# Patient Record
Sex: FEMALE | Race: WHITE | NOT HISPANIC OR LATINO | Employment: FULL TIME | URBAN - METROPOLITAN AREA
[De-identification: names, ages, dates, MRNs, and addresses within clinical notes are randomized per-mention and may not be internally consistent; named-entity substitution may affect disease eponyms.]

---

## 2017-09-14 ENCOUNTER — TRANSCRIBE ORDERS (OUTPATIENT)
Dept: ADMINISTRATIVE | Facility: HOSPITAL | Age: 52
End: 2017-09-14

## 2017-09-14 DIAGNOSIS — Z12.31 ENCOUNTER FOR SCREENING MAMMOGRAM FOR MALIGNANT NEOPLASM OF BREAST: Primary | ICD-10-CM

## 2017-10-16 ENCOUNTER — ALLSCRIPTS OFFICE VISIT (OUTPATIENT)
Dept: OTHER | Facility: OTHER | Age: 52
End: 2017-10-16

## 2017-10-16 DIAGNOSIS — Z12.31 ENCOUNTER FOR SCREENING MAMMOGRAM FOR MALIGNANT NEOPLASM OF BREAST: ICD-10-CM

## 2017-10-17 NOTE — PROGRESS NOTES
Assessment  1  Encounter for routine gynecological examination () (Z01 419)    Plan  Encounter for screening mammogram for malignant neoplasm of breast    · * MAMMO SCREENING BILATERAL W CAD; Status:Hold For - Scheduling; Requested  for:2017;    Perform:[facility] Radiology; Due:2018; Ordered;For:Encounter for screening mammogram for malignant neoplasm of breast; Ordered Tellnevaeh Moore;  Health Maintenance    · 1 - Robert MCKEON, Avery Deshpande (Gastroenterology) Co-Management  *colonoscopy  Status:  Active  Requested for: 61KHN8887   Ordered; For: Health Maintenance; Ordered By: Daniel Dsouza Performed:  Due: 64MGX1597  Care Summary provided  : Yes  Stress Incontinence    · 2 - Massimo OLIVEIRA, Anna Chen  (Obstetrics/Gynecology) Co-Management  *  Status: Hold For -  Scheduling  Requested for: 96XNK9518   Ordered; For: Stress Incontinence; Ordered By: Daniel Dsouza Performed:  Due: 54ADV1362  Care Summary provided  : Yes    Discussion/Summary  health maintenance visit Currently, she eats a healthy diet  the risks and benefits of cervical cancer screening were discussed cervical cancer screening is current Breast cancer screening: monthly self breast exam was advised, mammogram has been ordered and mammogram is needed every year  Colorectal cancer screening: colonoscopy has been ordered  Patient discussion: discussed with the patient  The patient has the current Goals: Well-woman exam  The patent has the current Barriers: None  Patient is able to Self-Care  Chief Complaint  pt presents for a yearly exam      History of Present Illness  HPI: 47 yo  for annual exam, still having problems with LAURO, but improving with weight loss - started the Whole 30making a visit with Colville Crew but deductible was very high;     GYN HM, Adult Female [facility]: The patient is being seen for a gynecology evaluation  The last health maintenance visit was 1 5 year(s) ago  General Health:  The patient's health since the last visit is described as good  Lifestyle:  She does not use tobacco -- She denies alcohol use  -- She denies drug use  Reproductive health:  she reports no menstrual problems  -- she is sexually active  -- pregnancy history: G 5P 4  Screening: Cervical cancer screening includes a pap smear performed -- and-- human papilloma virus screening performed   Breast cancer screening includes a mammogram performed 2016  She hasn't been previously screened for colorectal cancer  Review of Systems  urinary loss of control, but-- no pelvic pain,-- no pelvic pressure,-- no vaginal pain,-- no vaginal discharge,-- no vaginal itching,-- no vaginal lump or mass,-- no vaginal odor,-- no nonmenstrual bleeding,-- no vulvar pain,-- no vulvar itching,-- no vulvar lump or mass-- and-- no dysuria  Constitutional: No fever, no chills, feels well, no tiredness, no recent weight gain or loss  ENT: no ear ache, no loss of hearing, no nosebleeds or nasal discharge, no sore throat or hoarseness  Cardiovascular: no complaints of slow or fast heart rate, no chest pain, no palpitations, no leg claudication or lower extremity edema  Respiratory: no complaints of shortness of breath, no wheezing, no dyspnea on exertion, no orthopnea or PND  Breasts: no complaints of breast pain, breast lump or nipple discharge  Gastrointestinal: no complaints of abdominal pain, no constipation, no nausea or diarrhea, no vomiting, no bloody stools  Genitourinary: as noted in HPI  Musculoskeletal: no complaints of arthralgia, no myalgia, no joint swelling or stiffness, no limb pain or swelling  Integumentary: no complaints of skin rash or lesion, no itching or dry skin, no skin wounds  Neurological: no complaints of headache, no confusion, no numbness or tingling, no dizziness or fainting  ROS reviewed  OB History  Pregnancy History (Brief):   Prior pregnancies: : 4  Para: 4 (full-term)       Active Problems  1   BMI 27 0-27 9,adult (V85 23) (Z68 27)   2  Encounter for routine gynecological examination (V72 31) (Z01 419)   3  Encounter for routine gynecological examination (V72 31) (Z01 419)   4  Encounter for screening mammogram for malignant neoplasm of breast (V76 12)   (Z12 31)   5  Screening for HPV (human papillomavirus) (V73 81) (Z11 51)   6  Stress Incontinence (788 39)    Past Medical History    The active problems and past medical history were reviewed and updated today  Surgical History   · History of Supracervical Hysterectomy    The surgical history was reviewed and updated today  Family History    The family history was reviewed and updated today  Social History   · Never A Smoker  The social history was reviewed and updated today  The social history was reviewed and is unchanged  Allergies  1  No Known Drug Allergies    Vitals   Recorded: 18QGW7528 30:43AD   Systolic 741   Diastolic 58   Height 5 ft 7 in   Weight 167 lb    BMI Calculated 26 16   BSA Calculated 1 87     Physical Exam    Constitutional   General appearance: No acute distress, well appearing and well nourished  Neck   Neck: Normal, supple, trachea midline, no masses  Thyroid: Normal, no thyromegaly  Pulmonary   Respiratory effort: No increased work of breathing or signs of respiratory distress  Auscultation of lungs: Clear to auscultation  Cardiovascular   Auscultation of heart: Normal rate and rhythm, normal S1 and S2, no murmurs  Peripheral vascular exam: Normal pulses Throughout  Genitourinary   External genitalia: Normal and no lesions appreciated  Vagina: Normal, no lesions or dryness appreciated  Urethra: Normal     Urethral meatus: Normal     Bladder: Normal, soft, non-tender and no prolapse or masses appreciated  Cervix: Normal, no palpable masses  Uterus: Surgically absent  Adnexa/parametria: Normal, non-tender and no fullness or masses appreciated      Chest   Breasts: Normal and no dimpling or skin changes noted  Abdomen   Abdomen: Normal, non-tender, and no organomegaly noted  Lymphatic   Palpation of lymph nodes in neck, axillae, groin and/or other locations: No lymphadenopathy or masses noted  Skin   Skin and subcutaneous tissue: Normal skin turgor and no rashes  Palpation of skin and subcutaneous tissue: Normal     Psychiatric   Orientation to person, place, and time: Normal     Mood and affect: Normal        Future Appointments    Date/Time Provider Specialty Site   10/18/2018 06:30 PM DIMITRIS Yan   [facility] [place]     Signatures   Electronically signed by : DIMITRIS Savage ; Oct 16 2017 10:27AM EST                       (Author)

## 2017-11-29 ENCOUNTER — ALLSCRIPTS OFFICE VISIT (OUTPATIENT)
Dept: OTHER | Facility: OTHER | Age: 52
End: 2017-11-29

## 2017-11-29 DIAGNOSIS — N39.498 OTHER SPECIFIED URINARY INCONTINENCE (CODE): ICD-10-CM

## 2017-11-30 NOTE — CONSULTS
Assessment    1  Urinary incontinence NEC (788 39) (N39 [id number])    Plan  Urinary incontinence NEC    · 2 - Massimo OLIVEIRA, Celestina Hough  (Obstetrics/Gynecology) Co-Management  *  Status: Hold For -Scheduling  Requested for: 18PSR0030  Ordered; For: Urinary incontinence NEC;  Ordered By: Kelly Yoder  Performed:   Due: 23VHK8377  () Care Summary provided  : Yes   · SL Pelvic Floor Physical Therapy Referral Co-Management  *  Status: Active  Requestedfor: 58LVP1136  Ordered; For: Urinary incontinence NEC;  Ordered By: Kelly Yoder  Performed:   Due: 36EZK2828  () Care Summary provided  : Yes    Discussion/Summary  Discussion Summary:   I have offered to set the patient up for pelvic floor exam and cystoscopy to evaluate for coexisting prolapse and did demonstrate her stress urinary incontinence  This would be a pre records that prior to simple mid urethral sling surgery  patient is extremely motivated that if she were to need surgery she would want to proceed before the end of 2017  Because of scheduling constraints, I have also offered to refer the patient to an additional pelvic floor surgeon who may have more availability in this regard  If the patient does not have success with scheduling, I would be happy to move forward with her testing here in the office  the meantime, the patient has requested referral to pelvic floor physical therapy  This may be beneficial in helping the patient with non operative management  She will call me after she fingers out the scheduling constraints and alert me if she is interested in moving forward  Patient's Capacity to Self-Care: Patient is able to Self-Care  Goals and Barriers: The patient has the current Goals: Improvement of continence  The patent has the current Barriers: Insurance and timing  Self Referrals:   Self Referrals: Yes1        1 Amended By: Dedra Desir;  Nov 30 2017 9:08 AM EST    Chief Complaint  Chief Complaint Free Text Note Form: Pt presents to office for consult for stress incontinence  History of Present Illness  HPI: 57-year-old female with a history of a lifetime of stress urinary incontinence even in her teenage years  Patient has had a hysterectomy and has significant stress urinary incontinence  She is physically very active and notes incontinence when she exercises  She has not previously been seen by urologist  Her GYN doctor recommended referral for sling placement  patient is very interested in expedited surgery if that is a potential option for her before the end of 2017  Review of Systems  Complete-Female Urology:  Genitourinary: Empty sensation,-- incontinence-- and-- stream quality good, but-- no dysuria,-- no urinary hesitancy,-- no feelings of urinary urgency,-- no hematuria-- and-- no nocturia  ROS Reviewed:   ROS reviewed  Active Problems    1  BMI 27 0-27 9,adult (V85 23) (Z68 27)  2  Encounter for routine gynecological examination (V72 31) (Z01 419)  3  Encounter for routine gynecological examination (V72 31) (Z01 419)  4  Encounter for screening mammogram for malignant neoplasm of breast (V76 12) (Z12 31)  5  Screening for HPV (human papillomavirus) (V73 81) (Z11 51)  6  Urinary incontinence NEC (788 39) (Q20 373)    Past Medical History  Active Problems And Past Medical History Reviewed: The active problems and past medical history were reviewed and updated today  Surgical History  1  History of Supracervical Hysterectomy  Surgical History Reviewed: The surgical history was reviewed and updated today  Family History  Family History Reviewed: The family history was reviewed and updated today  Social History   ·    · Never A Smoker   · No alcohol use  Social History Reviewed: The social history was reviewed and updated today  Current Meds  1  No Reported Medications Recorded  Medication List Reviewed: The medication list was reviewed and updated today  Allergies  1   No Known Drug Allergies    Vitals  Vital Signs    Recorded: 98SQU2816 01:46PM   Heart Rate 72   Respiration 16   Systolic 201   Diastolic 80   Height 5 ft 7 in   Weight 171 lb    BMI Calculated 26 78   BSA Calculated 1 89       Physical Exam   Constitutional  General appearance: No acute distress, well appearing and well nourished  Pulmonary  Respiratory effort: No increased work of breathing or signs of respiratory distress  Cardiovascular  Examination of extremities for edema and/or varicosities: Normal    Abdomen  Abdomen: Non-tender, no masses  Musculoskeletal  Gait and station: Normal    Skin  Skin and subcutaneous tissue: Normal without rashes or lesions  Results/Data  AUA Symptom Score [id number] 01:57PM User, [employer]     Test Name Result Flag Reference   AUA Symptom Score (for prostate disease) 3       Incomplete emptying: Not at all (0) Frequency: About half the time (3) Intermittency: Not at all (0) Urgency: Not at all (0) Weak-stream: Not at all (0) Straining: Not at all (0) Nocturia: Not at all (0)   AUA Symptom Score (for prostate disease) - Quality of Life Due to Urinary Symptoms Unhappy     AUA Symptom Score (for prostate disease) - Score Category Mild         Future Appointments    Date/Time Provider Specialty Site   10/18/2018 06:30 PM DIMITRIS Erazo   Obstetrics/Gynecology OB [employer] [facility]       Signatures   Electronically signed by : DIMITRIS Peña ; Nov 29 2017  2:18PM EST                       (Author)    Electronically signed by : DIMITRIS Peña ; Nov 30 2017 12:11PM EST

## 2018-01-14 VITALS
DIASTOLIC BLOOD PRESSURE: 80 MMHG | WEIGHT: 171 LBS | HEIGHT: 67 IN | RESPIRATION RATE: 16 BRPM | SYSTOLIC BLOOD PRESSURE: 130 MMHG | HEART RATE: 72 BPM | BODY MASS INDEX: 26.84 KG/M2

## 2018-01-15 VITALS
WEIGHT: 167 LBS | BODY MASS INDEX: 26.21 KG/M2 | SYSTOLIC BLOOD PRESSURE: 100 MMHG | HEIGHT: 67 IN | DIASTOLIC BLOOD PRESSURE: 58 MMHG

## 2018-01-18 NOTE — RESULT NOTES
Verified Results  (1) THIN PREP PAP WITH IMAGING [id number] 11:01AM Katerine John     Test Name Result Flag Reference   LAB AP CASE REPORT (Report)     Gynecologic Cytology Report            Case: [id number]                  Authorizing Provider: Debbie Tyson MD     Collected:      05/04/2016 1101        First Screen:     IVON Vega Received:      05/05/2016 1101        Specimen:  LIQUID-BASED PAP, SCREENING, Endocervical   HPV HIGH RISK RESULT (Report)     HPV, High Risk: HPV NEG, HPV16 NEG, HPV18 NEG      Other High Risk HPV Negative, HPV 16 Negative, HPV 18 Negative  HPV types: 16,18,31,33,35,39,45,51,52,56,58,59,66 and 68 DNA are undetectable or below the pre-set threshold  Roche?s FDA approved Cecile 4800 is utilized with strict adherence to the ?s instruction  manual to test for the presence of High-Risk HPV DNA, as well as HPV 16 and HPV 18  This instrument  has been validated by our laboratory and/or by the   A negative result does not preclude the presence of HPV infection because results depend on adequate  specimen collection, absence of inhibitors and sufficient DNA to be detected  Additionally, HPV negative  results are not intended to prevent women from proceeding to colposcopy if clinically warranted  Positive HPV test results indicate the presence of any one or more of the high risk types, but since patients  are often co-infected with low-risk types it does not rule out the presence of low-risk types in patients  with mixed infections  LAB AP GYN PRIMARY INTERPRETATION      Negative for intraepithelial lesion or malignancy   LAB AP GYN SPECIMEN ADEQUACY      Satisfactory for evaluation  Endocervical/transformation zone component present     LAB AP GYN ADDITIONAL INFORMATION (Report)     [employer]'s FDA approved ,  and ThinPrep Imaging System are   utilized with strict adherence to the 's instruction manual to   prepare gynecologic and non-gynecologic cytology specimens for the   production of ThinPrep slides as well as for gynecologic ThinPrep imaging  These processes have been validated by our laboratory and/or by the     The Pap test is not a diagnostic procedure and should not be used as the   sole means to detect cervical cancer  It is only a screening procedure to   aid in the detection of cervical cancer and its precursors  Both   false-negative and false-positive results have been experienced  Your   patient's test result should be interpreted in this context together with   the history and clinical findings     LAB AP LMP

## 2018-10-18 ENCOUNTER — ANNUAL EXAM (OUTPATIENT)
Dept: OBGYN CLINIC | Facility: MEDICAL CENTER | Age: 53
End: 2018-10-18
Payer: COMMERCIAL

## 2018-10-18 VITALS — WEIGHT: 178 LBS | SYSTOLIC BLOOD PRESSURE: 128 MMHG | DIASTOLIC BLOOD PRESSURE: 80 MMHG | BODY MASS INDEX: 27.88 KG/M2

## 2018-10-18 DIAGNOSIS — N39.3 SUI (STRESS URINARY INCONTINENCE, FEMALE): ICD-10-CM

## 2018-10-18 DIAGNOSIS — Z12.39 SCREENING FOR MALIGNANT NEOPLASM OF BREAST: ICD-10-CM

## 2018-10-18 DIAGNOSIS — N39.3 STRESS INCONTINENCE OF URINE: ICD-10-CM

## 2018-10-18 DIAGNOSIS — Z01.419 ENCOUNTER FOR WELL WOMAN EXAM WITH ROUTINE GYNECOLOGICAL EXAM: Primary | ICD-10-CM

## 2018-10-18 PROCEDURE — 99396 PREV VISIT EST AGE 40-64: CPT | Performed by: OBSTETRICS & GYNECOLOGY

## 2018-10-18 NOTE — PROGRESS NOTES
ASSESSMENT & PLAN: Bhavya Dunaway is a 45 y o  No obstetric history on file  with normal gynecologic exam     1   Routine well woman exam done today  2  Pap and HPV:  The patient's last pap and hpv was 2016  It was normal     Pap and cotesting was not done today  Current ASCCP Guidelines reviewed  3   Mammogram ordered  4  Colonoscopy - will refer to someone in [place]  5  The following were reviewed in today's visit: breast self exam and mammography screening ordered  6  GSUI - refer to Massimo    CC:  Annual Gynecologic Examination    HPI: Bhavya Dunaway is a 45 y o  No obstetric history on file  who presents for annual gynecologic examination  She has the following concerns:  Still complains of GSUI -   Interested in surgical management with pelvic floor therapy  Health Maintenance:    She wears her seatbelt routinely  She does perform regular monthly self breast exams  She feels safe at home  Pap: 2016  Last mammogram: 2016  Last colonoscopy: never    History reviewed  No pertinent past medical history  History reviewed  No pertinent surgical history  Past OB/Gyn History:  OB History     No data available        Pt does not have menstrual issues  History of sexually transmitted infection: No   History of abnormal pap smears: No      Patient is currently sexually active  heterosexual   The current method of family planning is none  History reviewed  No pertinent family history  Social History:  Social History     Social History    Marital status: /Civil Union     Spouse name: N/A    Number of children: N/A    Years of education: N/A     Occupational History    Not on file       Social History Main Topics    Smoking status: Never Smoker    Smokeless tobacco: Never Used    Alcohol use No    Drug use: No    Sexual activity: Yes     Partners: Male     Birth control/ protection: None     Other Topics Concern    Not on file     Social History Narrative    No narrative on file     No Known Allergies  No current outpatient prescriptions on file  Review of Systems  Constitutional :no fever, feels well, no tiredness, no recent weight gain or loss  ENT: no ear ache, no loss of hearing, no nosebleeds or nasal discharge, no sore throat or hoarseness  Cardiovascular: no complaints of slow or fast heart beat, no chest pain, no palpitations, no leg claudication or lower extremity edema  Respiratory: no complaints of shortness of shortness of breath, no FINE  Breasts:no complaints of breast pain, breast lump, or nipple discharge  Gastrointestinal: no complaints of abdominal pain, constipation, nausea, vomiting, or diarrhea or bloody stools  Genitourinary : no complaints of dysuria, incontinence, pelvic pain, no dysmenorrhea, vaginal discharge or abnormal vaginal bleeding and as noted in HPI  Musculoskeletal: no complaints of arthralgia, no myalgia, no joint swelling or stiffness, no limb pain or swelling    Integumentary: no complaints of skin rash or lesion, itching or dry skin  Neurological: no complaints of headache, no confusion, no numbness or tingling, no dizziness or fainting    Objective      /80   Wt 80 7 kg (178 lb)   BMI 27 88 kg/m²     General:   appears stated age, cooperative, alert normal mood and affect   Neck: normal, supple,trachea midline, no masses   Heart: regular rate and rhythm, S1, S2 normal, no murmur, click, rub or gallop   Lungs: clear to auscultation bilaterally   Breasts: normal appearance, no masses or tenderness, Inspection negative, No nipple retraction or dimpling, No nipple discharge or bleeding, No axillary or supraclavicular adenopathy, Normal to palpation without dominant masses   Abdomen: soft, non-tender, without masses or organomegaly   Vulva: normal female genitalia, Bartholin's, Urethra, Angostura normal, no lesions, normal female hair distribution   Vagina: normal vagina, no discharge, exudate, lesion, or erythema   Urethra: normal   Cervix: Normal, no discharge  Uterus: normal size, contour, position, consistency, mobility, non-tender   Adnexa: normal adnexa   Lymphatic palpation of lymph nodes in neck, axilla, groin and/or other locations: no lymphadenopathy or masses noted   Skin normal skin turgor and no rashes     Psychiatric orientation to person, place, and time: normal  mood and affect: normal

## 2018-11-05 ENCOUNTER — EVALUATION (OUTPATIENT)
Dept: PHYSICAL THERAPY | Facility: CLINIC | Age: 53
End: 2018-11-05
Payer: COMMERCIAL

## 2018-11-05 DIAGNOSIS — N39.3 SUI (STRESS URINARY INCONTINENCE, FEMALE): Primary | ICD-10-CM

## 2018-11-05 PROCEDURE — 97530 THERAPEUTIC ACTIVITIES: CPT | Performed by: PHYSICAL THERAPIST

## 2018-11-05 PROCEDURE — 97163 PT EVAL HIGH COMPLEX 45 MIN: CPT | Performed by: PHYSICAL THERAPIST

## 2018-11-05 NOTE — PROGRESS NOTES
PT Evaluation     Today's date: 2018  Patient name: Jesús Her  : 1965  MRN: [id number]  Referring provider: Mellissa Wellington MD  Dx:   Encounter Diagnosis     ICD-10-CM    1  LAURO (stress urinary incontinence, female) N39 3        Assessment    Assessment details: Shan Becerril is a 44 yo female who presents with chronic history of stress urinary incontinence, especially with exercise, sneezing, laughing and coughing  Subsequently she also reports a chronic history of constipation  Her fluid intake is good and bladder irritant intake is low  She demonstrates a quad dominant strength pattern as evidenced by decreased strength in bilateral gluteals and hamstrings  She demonstrates fair pelvic floor "lift" during pelvic floor muscle evaluation with good perineal mobility however exhibits poor PFM sphincter closure strength when performing a PFM contraction  She does not exhibit signs of pelvic organ prolapse and does not present with symptoms of pelvic floor muscle hypertonicity or pain  It is likely that chronic constipation as well as impaired PFM strength and poor breathe control are impacting her ability to engage in exercise without urinary leakage  She would benefit from a course of physical therapy to address safe strengthening activities (ie: has been doing sit ups consistently), running form and breathe control during exercise, pelvic floor muscle strength and LE flexibility/PFM flexibility  Thank you for referring Shan Becerril to PT  Goals    * Normalize sEMG findings to indicate strength average > 12uV and resting average < 2 0uV  -When available  * Presents with improved understanding of bladder irritants in diet and makes concerted effort to reduce them by at least 50-75%  -as needed  * Patient presents with good understanding of pelvic floor protective strategies to reduce intra-abdominal pressure against pelvic organs    * Demonstrates correct isolation and relaxation of pelvic floor to palpation without overflow from global stabilizers  * Reports that she can cough/laugh/sneeze with at least  50% less bladder leakage  * Patient will use pelvic floor muscles correctly during functional ADLs such as coughing, sneezing, lifting and exercise activities to avoid excessive IAP and PFM strain  * Patient will be compliant with comprehensive home exercise program for self management of condition  Plan  Planned therapy interventions: abdominal trunk stabilization, manual therapy, motor coordination training, neuromuscular re-education, patient education, self care, strengthening, therapeutic activities, stretching, therapeutic exercise and home exercise program  Frequency: 2x week  Duration in visits: 16  Treatment plan discussed with: patient        Subjective Evaluation    History of Present Illness  Mechanism of injury: Has an appointment with Dr Toni valdez for surgical consult  Patient reports long history of stress urinary incontinence (20 years) when physically active or sick/coughing  OB history:G5,P4,A1  All deliveries were vaginal  Largest baby was 6?lbs  Hysterectomy: ~10 years ago  Due to fibroids and vaginal pain  Pain  Do you have pain with  [place]-no  Pelvic exam-no  Do you have back, leg, groin, abdominal pain  No  "I get a vaginal pain just when I work really hard, overdoing my body"    Test results: None    Bladder Symptoms  Do you lose urine when you:   Cough-Yes   Sneeze-Yes   Laugh-Yes but not always   On the way to the bathroom: Yes if I am just coming back from a run  Hear running water: Yes    Do you have leakage during sleep: No  Have burning/pain with urination: No  Difficulty starting a stream of urine: NO  Strain to empty your bladder: No  Feel unable to empty bladder fully: No  Have a falling out feeling: No  Have pain with a full bladder  Have an urgency of urination (a strong urge to urinate): No  Urinate more than 7 times/day  "Probably"  Lift/exercise/dance/jump: Yes  Other: _____      Bowel Symptoms  Do you    Strain to have a bowel movement: Always   Include fiber in your diet:Yes   Take laxatives/enema regularly: No   Have pain with bowel movement: No   Have a very strong urge to move your bowels: No   Leak/stain feces: No   Have diarrhea often: No   Leak gas by accident: Not really  How often do you move your bowels: _1__ per day  Most common stool consistency: #2 [place] Stool Chart  Fluid intake: 1/2 gallon per day most days    -Water  Will occasionally drink tea or coffee but not often  Pain  Current pain ratin  At best pain ratin  Progression: worsening    Social Support    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[employer])  Treatments  No previous or current treatments  Patient Goals  Patient goal: Decrease/eliminate urinary leakage        Objective    Bilateral hip PROM assessed and noted for decreased hip flexion, bilateral piriformis tenderness, bilateral quadriceps tightness and bilateral adductor tightness (mild)  Palpation to linea alba with active rectus contraction revealed 3 finger width diastasis at level of umbilicus  Lumbopelvic control for bridge/stability position: fair   Strength: Grossly WNL  Breathe control: Poor ability to isolate diaphragmatic breathe with notable apical breathing pattern when attempting diaphragmatic breathe when cued  Internal PFM exam performed 18:   Pelvic floor muscle assessment  Pelvic floor anatomy was explained to patient utilizing a pelvic model and examination technique was discussed, including all risks and precautions   Patient provided verbal and written consent for internal pelvic floor muscle assessment prior to examination       External observation: + Perineal descent, + non-voluntary contraction, + voluntary contraction       Reflexes: Anal wink test present     Internal pelvic floor assessment:   4/5 strength, 5 second endurance  Able to complete 5 contract/relax in 10 seconds  No tenderness to palpation to levator ani, ATLA, obturator internis or superficial PFM  No palpation of bladder or rectal prolapse noted       Education: Pelvic floor stretch, 10 second contraction/hold in hooklying and bridges with PFM contraction  Reviewed performing the "knack" prior to coughing/sneezing/laughing  Reviewed diaphragmatic breathing    Precautions: Standard    Daily Treatment Diary     Manual     Assess PFM                        Exercise Diary     Diaphragmatic breathing    PFM strengthening progression  HEP: Soften bowels (will try prunes), use stool for hip flexion during bowel movements

## 2018-11-06 ENCOUNTER — OFFICE VISIT (OUTPATIENT)
Dept: PHYSICAL THERAPY | Facility: CLINIC | Age: 53
End: 2018-11-06
Payer: COMMERCIAL

## 2018-11-06 DIAGNOSIS — N39.3 SUI (STRESS URINARY INCONTINENCE, FEMALE): Primary | ICD-10-CM

## 2018-11-06 PROCEDURE — 97530 THERAPEUTIC ACTIVITIES: CPT | Performed by: PHYSICAL THERAPIST

## 2018-11-06 PROCEDURE — 97110 THERAPEUTIC EXERCISES: CPT | Performed by: PHYSICAL THERAPIST

## 2018-11-06 NOTE — PROGRESS NOTES
Daily Note     Today's date: 2018  Patient name: Rosy Aguirre  : 1965  MRN: [id number]  Referring provider: Arti Zimmerman MD  Dx:   Encounter Diagnosis     ICD-10-CM    1  LAURO (stress urinary incontinence, female) N39 3      Subjective: Patient reports that she will start prunes to work on softening her bowels  Objective: See treatment diary below  Pelvic floor muscle assessment  Pelvic floor anatomy was explained to patient utilizing a pelvic model and examination technique was discussed, including all risks and precautions  Patient provided verbal and written consent for internal pelvic floor muscle assessment prior to examination  External observation: + Perineal descent, + non-voluntary contraction, + voluntary contraction  Reflexes: Anal wink test present    Internal pelvic floor assessment:   4/5 strength, 5 second endurance  Able to complete 5 contract/relax in 10 seconds  No tenderness to palpation to levator ani, ATLA, obturator internis or superficial PFM  No palpation of bladder or rectal prolapse noted  Education: Pelvic floor stretch, 10 second contraction/hold in hooklying and bridges with PFM contraction  Reviewed performing the "knack" prior to coughing/sneezing/laughing  Reviewed diaphragmatic breathing      Assessment: Tolerated treatment well  Patient would benefit from continued PT  Patient demonstrates poor ability to coordinate diaphragmatic breathing and is improved with verbal and tactile cuing  Her exam revealed PFM strength which is fair and has good timing however she demonstrates fair "lift" but poor "closure" upon completing a PFM contraction  She denied pain or tenderness to palpation and demonstrates good pelvic floor mobility throughout ROM  Plan: Continue per plan of care

## 2018-11-12 ENCOUNTER — APPOINTMENT (OUTPATIENT)
Dept: PHYSICAL THERAPY | Facility: CLINIC | Age: 53
End: 2018-11-12
Payer: COMMERCIAL

## 2018-11-14 ENCOUNTER — OFFICE VISIT (OUTPATIENT)
Dept: PHYSICAL THERAPY | Facility: CLINIC | Age: 53
End: 2018-11-14
Payer: COMMERCIAL

## 2018-11-14 DIAGNOSIS — N39.3 SUI (STRESS URINARY INCONTINENCE, FEMALE): Primary | ICD-10-CM

## 2018-11-14 PROCEDURE — 97140 MANUAL THERAPY 1/> REGIONS: CPT | Performed by: PHYSICAL THERAPIST

## 2018-11-14 PROCEDURE — 97530 THERAPEUTIC ACTIVITIES: CPT | Performed by: PHYSICAL THERAPIST

## 2018-11-14 PROCEDURE — 97110 THERAPEUTIC EXERCISES: CPT | Performed by: PHYSICAL THERAPIST

## 2018-11-14 NOTE — PROGRESS NOTES
Daily Note     Today's date: 2018  Patient name: Roland Oglesby  : 1965  MRN: [id number]  Referring provider: Porfirio Mendosa MD  Dx:   Encounter Diagnosis     ICD-10-CM    1  LAURO (stress urinary incontinence, female) N39 3      Subjective: Patient reports that when initially starting prunes (2x/day) she initially had gas but it has improved  She reports that she has noticed more constipation but has not been working out this week or had a good diet due to life stress  Objective: See treatment diary below  Precautions: Standard    Specialty Daily Treatment Diary     Manual         Colon massage (ILU) Sigmoid, descending, transverse, ascending: x10min  Exercise Diary         Hooklying PFM With exhalations x2min       Hip ER/AB with PFM with exhalations Yellow TB, x2min       Bolster squeeze, PFM with exhalations x2min       Diaphragmatic breathing Supine: x5min with cues for decreasing apical breathing pattern  Modalities                                      Education: Pelvic floor stretch, 10 second contraction/hold in hooklying and bridges with PFM contraction  Reviewed performing the "knack" prior to coughing/sneezing/laughing  Reviewed diaphragmatic breathing  : Reviewed and provided illustrations for abdominal massage  Assessment: Tolerated treatment well  Patient would benefit from continued PT  With cuing patient achieved appropriate breathing pattern during PFM exercise progression  Plan: Continue per plan of care

## 2018-11-19 ENCOUNTER — OFFICE VISIT (OUTPATIENT)
Dept: PHYSICAL THERAPY | Facility: CLINIC | Age: 53
End: 2018-11-19
Payer: COMMERCIAL

## 2018-11-19 DIAGNOSIS — N39.3 SUI (STRESS URINARY INCONTINENCE, FEMALE): Primary | ICD-10-CM

## 2018-11-19 PROCEDURE — 97110 THERAPEUTIC EXERCISES: CPT | Performed by: PHYSICAL THERAPIST

## 2018-11-19 PROCEDURE — 97140 MANUAL THERAPY 1/> REGIONS: CPT | Performed by: PHYSICAL THERAPIST

## 2018-11-19 PROCEDURE — 97530 THERAPEUTIC ACTIVITIES: CPT | Performed by: PHYSICAL THERAPIST

## 2018-11-19 NOTE — PROGRESS NOTES
Daily Note     Today's date: 2018  Patient name: Aki Santiago  : 1965  MRN: [id number]  Referring provider: Austin Chandler MD  Dx:   Encounter Diagnosis     ICD-10-CM    1  LAURO (stress urinary incontinence, female) N39 3      Subjective: Patient reports that she has been very busy with work and house-hold family duties and when working non-stop at home until the evening she experiences vaginal pain and heaviness  She denies any swelling  She reports that she has been more aware of her constipation  Objective: See treatment diary below  Precautions: Standard    Specialty Daily Treatment Diary     Manual         Colon massage (ILU) Sigmoid, descending, transverse, ascending: x10min  Coccyx mob  For extension, seated and prone  Taping  For coccyx extension-kinesiotape                          Exercise Diary         Hooklying PFM With exhalations x2min       Hip ER/AB with PFM with exhalations Yellow TB, x2min       Bolster squeeze, PFM with exhalations x2min       Diaphragmatic breathing Supine: x5min with cues for decreasing apical breathing pattern  Supine x5min with self and verbal cues  Glute max strengthening  Step Ups 2x10 ea side with cues for glute Max and level pelvis  Quadruped cats camels        Quadruped hip circles                                                                                                                    Modalities                                      Education: Pelvic floor stretch, 10 second contraction/hold in hooklying and bridges with PFM contraction  Reviewed performing the "knack" prior to coughing/sneezing/laughing  Reviewed diaphragmatic breathing  : Reviewed and provided illustrations for abdominal massage  HEP 18: Supine piriformis stretches, standing obturator internis stretch, step ups  Assessment: Tolerated treatment well   Coccyx position assessed this visit and patient noted to have forward flexed coccyx  She tolerated coccyx mobility/manual therapy however no immediate improvement in positioning palpated  She requires advanced cuing for glute-max isolation/strengthening  Plan: Continue per plan of care

## 2018-11-21 ENCOUNTER — APPOINTMENT (OUTPATIENT)
Dept: PHYSICAL THERAPY | Facility: CLINIC | Age: 53
End: 2018-11-21
Payer: COMMERCIAL

## 2018-11-27 ENCOUNTER — OFFICE VISIT (OUTPATIENT)
Dept: PHYSICAL THERAPY | Facility: CLINIC | Age: 53
End: 2018-11-27
Payer: COMMERCIAL

## 2018-11-27 DIAGNOSIS — N39.3 SUI (STRESS URINARY INCONTINENCE, FEMALE): Primary | ICD-10-CM

## 2018-11-27 PROCEDURE — 97110 THERAPEUTIC EXERCISES: CPT | Performed by: PHYSICAL THERAPIST

## 2018-11-27 PROCEDURE — 97530 THERAPEUTIC ACTIVITIES: CPT | Performed by: PHYSICAL THERAPIST

## 2018-11-27 NOTE — PROGRESS NOTES
Daily Note     Today's date: 2018  Patient name: Violet Telles  : 1965  MRN: [id number]  Referring provider: Rafal Milian MD  Dx:   Encounter Diagnosis     ICD-10-CM    1  LAURO (stress urinary incontinence, female) N39 3      Subjective: Patient reports that she saw Dr Salima Fuentes office  She has scheduled an appt for  for surgery  She was advised to prepare with hip flexibility/strengthening and softening bowels  Patient reports that her constipation has improved as her bowels are formed/soft but "large"  She reports she had testing and "something" inserted into her urethra and had vaginal pain since then which is improving  Objective: See treatment diary below  Precautions: Standard    Specialty Daily Treatment Diary     Manual        Colon massage (ILU) Sigmoid, descending, transverse, ascending: x10min  Coccyx mob  For extension, seated and prone  Taping  For coccyx extension-kinesiotape                          Exercise Diary         Hooklying PFM With exhalations x2min       Hip ER/AB with PFM with exhalations Yellow TB, x2min       Bolster squeeze, PFM with exhalations x2min       Diaphragmatic breathing Supine: x5min with cues for decreasing apical breathing pattern  Supine x5min with self and verbal cues  With there-ex, cues to avoid excessive apical breathing      Glute max strengthening  Step Ups 2x10 ea side with cues for glute Max and level pelvis  HEP     Quadruped cats camels        Quadruped hip circles        Squats   X20, rise with exhalation and PFM contraction, lower with inhalation and PFM relaxation  Pelvic Lateral [place]   Physioball x20     Pelvic tilts   x20 with PFM contraction     Pelvic floor lift offs   On Physioball: with PFM relaxation with inhalation, contraction with exhalation: Cues for posture and diaphragmatic inhalation  x20 breaths  Quadruped   Glute lifts with neutral spine: x10 ea side  Modalities                                      Education: Pelvic floor stretch, 10 second contraction/hold in hooklying and bridges with PFM contraction  Reviewed performing the "knack" prior to coughing/sneezing/laughing  Reviewed diaphragmatic breathing  11/14: Reviewed and provided illustrations for abdominal massage  HEP 11/19/18: Supine piriformis stretches, standing obturator internis stretch, step ups  Assessment: Tolerated treatment well  Patient unable to report if improvement with LAURO since has not been exercising as much recently due to the holidays  She was educated on role of hip mobility, strengthening and PFM mobility for higher level impact sports and need for ongoing there-ex and  HEP when cleared to do so following her upcomming surgery  It is unclear what procedure she is electing to have and therapist to reach out to physician for further details  Plan: Continue per plan of care

## 2018-12-04 ENCOUNTER — OFFICE VISIT (OUTPATIENT)
Dept: PHYSICAL THERAPY | Facility: CLINIC | Age: 53
End: 2018-12-04
Payer: COMMERCIAL

## 2018-12-04 DIAGNOSIS — N39.3 SUI (STRESS URINARY INCONTINENCE, FEMALE): Primary | ICD-10-CM

## 2018-12-04 PROCEDURE — 97110 THERAPEUTIC EXERCISES: CPT | Performed by: PHYSICAL THERAPIST

## 2018-12-04 NOTE — PROGRESS NOTES
Daily Note     Today's date: 2018  Patient name: Robert Aponte  : 1965  MRN: [id number]  Referring provider: Sobeida Rubin MD  Dx:   Encounter Diagnosis     ICD-10-CM    1  LAURO (stress urinary incontinence, female) N39 3      Subjective: Patient reports that she has not been doing her home exercises as indicated and has had a very busy week with a new granddaughter  Patient reports that her bowels have been more constipated since running out of prunes  She reports that the feeling of menstrual cramps since having her urogyn appt/testing  She is now sick so hasn't been able to run  Objective: See treatment diary below  Precautions: Standard    Specialty Daily Treatment Diary     Manual       Colon massage (ILU) Sigmoid, descending, transverse, ascending: x10min  Coccyx mob  For extension, seated and prone  Taping  For coccyx extension-kinesiotape                          Exercise Diary         Hooklying PFM With exhalations x2min       Hip ER/AB with PFM with exhalations Yellow TB, x2min   Blue TB, x2min  Bolster squeeze, PFM with exhalations x2min   Ball squeeze: x2min  Diaphragmatic breathing Supine: x5min with cues for decreasing apical breathing pattern  Supine x5min with self and verbal cues  With there-ex, cues to avoid excessive apical breathing  X1min, supine:     Glute max strengthening  Step Ups 2x10 ea side with cues for glute Max and level pelvis  HEP     Quadruped cats camels    With PFM/breathe control: x2min    Quadruped hip circles        Squats   X20, rise with exhalation and PFM contraction, lower with inhalation and PFM relaxation  Pelvic Lateral [place]   Physioball x20 Quadruped: x1min  Pelvic tilts   x20 with PFM contraction     Pelvic floor lift offs   On Physioball: with PFM relaxation with inhalation, contraction with exhalation: Cues for posture and diaphragmatic inhalation  x20 breaths   On physioball: Jeevan Jeter lifts with neutral spine: x10 ea side  Hooklying    90/90 knees "lift and squeeze" x2min  Flexibility    Bilateral hip flexion/abd/ER 2x30" in supine position  Bilateral hip flexor stretches on physioball 1x30" ea side  Modalities                                    Education: Pelvic floor stretch, 10 second contraction/hold in hooklying and bridges with PFM contraction  Reviewed performing the "knack" prior to coughing/sneezing/laughing  Reviewed diaphragmatic breathing  11/14: Reviewed and provided illustrations for abdominal massage  HEP 11/19/18: Supine piriformis stretches, standing obturator internis stretch, step ups  Assessment: Tolerated treatment well  Patient reports intermittent completion of her HEP  She reports she has been more aware of her breathing  She would like to start prunes again to work on improving her constipation  Her physician advised her to start colace pre operatively  Plan: Continue per plan of care

## 2018-12-11 ENCOUNTER — OFFICE VISIT (OUTPATIENT)
Dept: PHYSICAL THERAPY | Facility: CLINIC | Age: 53
End: 2018-12-11
Payer: COMMERCIAL

## 2018-12-11 DIAGNOSIS — N39.3 SUI (STRESS URINARY INCONTINENCE, FEMALE): Primary | ICD-10-CM

## 2018-12-11 PROCEDURE — 97110 THERAPEUTIC EXERCISES: CPT | Performed by: PHYSICAL THERAPIST

## 2018-12-11 NOTE — PROGRESS NOTES
Daily Note     Today's date: 2018  Patient name: Rashida Bowden  : 1965  MRN: [id number]  Referring provider: Kp Wallace MD  Dx:   Encounter Diagnosis     ICD-10-CM    1  LAURO (stress urinary incontinence, female) N39 3      Subjective: Patient reports that she started with some exercise this week and wants to continue  Her surgery is scheduled for next week  Objective: See treatment diary below  Precautions: Standard    Specialty Daily Treatment Diary     Manual      Colon massage (ILU) Sigmoid, descending, transverse, ascending: x10min  Coccyx mob  For extension, seated and prone  Taping  For coccyx extension-kinesiotape                          Exercise Diary         Hooklying PFM With exhalations x2min    Bridges with PFM x2min  Hip ER/AB with PFM with exhalations Yellow TB, x2min   Blue TB, x2min  Bolster squeeze, PFM with exhalations x2min   Ball squeeze: x2min  Ball squeeze x2min  Diaphragmatic breathing Supine: x5min with cues for decreasing apical breathing pattern  Supine x5min with self and verbal cues  With there-ex, cues to avoid excessive apical breathing  X1min, supine:     Glute max strengthening  Step Ups 2x10 ea side with cues for glute Max and level pelvis  HEP     Quadruped cats camels    With PFM/breathe control: x2min    Quadruped hip circles        Squats   X20, rise with exhalation and PFM contraction, lower with inhalation and PFM relaxation  Plie x2min   Pelvic Lateral [place]   Physioball x20 Quadruped: x1min  Pelvic tilts   x20 with PFM contraction     Pelvic floor lift offs   On Physioball: with PFM relaxation with inhalation, contraction with exhalation: Cues for posture and diaphragmatic inhalation  x20 breaths  On physioball: x1min    Quadruped   Glute lifts with neutral spine: x10 ea side  Hooklying    90/90 knees "lift and squeeze" x2min       Flexibility    Bilateral hip flexion/abd/ER 2x30" in supine position  Bilateral hip flexor stretches on physioball 1x30" ea side  Lunges     Forward/backward: x10 on ea side with PFM lift upon rising to stand with exhalation  Modalities                                    Education: Pelvic floor stretch, 10 second contraction/hold in hooklying and bridges with PFM contraction  Reviewed performing the "knack" prior to coughing/sneezing/laughing  Reviewed diaphragmatic breathing  11/14: Reviewed and provided illustrations for abdominal massage  HEP 11/19/18: Supine piriformis stretches, standing obturator internis stretch, step ups  HEP: 12/11/18: Smart [employer] Exercises handout reviewed and completed  Standing Plie exercise handout  Assessment: Tolerated treatment well  Pt demonstrating good breathe control and form during there-ex this visit  She would benefit from an additional session prior to her surgery to review a full exercise program, pt encouraged to wear exercise attire for [address] visit  Plan: Continue per plan of care

## 2018-12-12 ENCOUNTER — OFFICE VISIT (OUTPATIENT)
Dept: PHYSICAL THERAPY | Facility: CLINIC | Age: 53
End: 2018-12-12
Payer: COMMERCIAL

## 2018-12-12 DIAGNOSIS — N39.3 SUI (STRESS URINARY INCONTINENCE, FEMALE): Primary | ICD-10-CM

## 2018-12-12 PROCEDURE — 97110 THERAPEUTIC EXERCISES: CPT | Performed by: PHYSICAL THERAPIST

## 2018-12-12 PROCEDURE — 97140 MANUAL THERAPY 1/> REGIONS: CPT | Performed by: PHYSICAL THERAPIST

## 2018-12-12 NOTE — PROGRESS NOTES
Daily Note / Discharge Summary    Today's date: 2018  Patient name: Dale Taveras  : 1965  MRN: [id number]  Referring provider: Galina Medina MD  Dx:   Encounter Diagnosis     ICD-10-CM    1  LAURO (stress urinary incontinence, female) N39 3      Subjective: Patient reports no changes since yesterday's session  She is agreeable to a "workout" to assess symptom replication and trouble shoot preventative techniques  Pt agreeable to d/c at this time and understands a referral from her surgeon is needed to return post operatively  Objective: See treatment diary below  Precautions: Standard    Specialty Daily Treatment Diary     Manual     Colon massage (ILU)        Coccyx mob  For extension, seated and prone  Taping  For coccyx extension-kinesiotape      LE PROM x15min                   Exercise Diary         Hooklying PFM     Bridges with PFM x2min  Hip ER/AB with PFM with exhalations    Blue TB, x2min  Bolster squeeze, PFM with exhalations    Ball squeeze: x2min  Ball squeeze x2min  Diaphragmatic breathing  Supine x5min with self and verbal cues  With there-ex, cues to avoid excessive apical breathing  X1min, supine:     Glute max strengthening Quadruped: Leg lifts/arcs 2x10 ea side  Step Ups 2x10 ea side with cues for glute Max and level pelvis  HEP     Quadruped cats camels    With PFM/breathe control: x2min    Quadruped hip circles        Squats With 6# weight as "kettle bell" x20 with PFM coordinated/breathing  X20, rise with exhalation and PFM contraction, lower with inhalation and PFM relaxation  Plie x2min   Pelvic Lateral [place]   Physioball x20 Quadruped: x1min  Pelvic tilts   x20 with PFM contraction     Pelvic floor lift offs   On Physioball: with PFM relaxation with inhalation, contraction with exhalation: Cues for posture and diaphragmatic inhalation  x20 breaths   On physioball: x1min    Quadruped   Glute lifts with neutral spine: x10 ea side  Hooklying    90/90 knees "lift and squeeze" x2min  Flexibility    Bilateral hip flexion/abd/ER 2x30" in supine position  Bilateral hip flexor stretches on physioball 1x30" ea side  Lunges Lateral Lunges: x10 ea side  Cues for glute squeezed    Forward/backward: x10 on ea side with PFM lift upon rising to stand with exhalation  Dynamic ankle PF x30 with PFM       Plantar fascial mobility Standing ball longitudinal and medial arch x3 ea, bilaterally  Glute stretch Hip ER/Flexion at edge of high mat  Education: Pelvic floor stretch, 10 second contraction/hold in hooklying and bridges with PFM contraction  Reviewed performing the "knack" prior to coughing/sneezing/laughing  Reviewed diaphragmatic breathing  11/14: Reviewed and provided illustrations for abdominal massage  HEP 11/19/18: Supine piriformis stretches, standing obturator internis stretch, step ups  HEP: 12/11/18: [employer] Exercises handout reviewed and completed  Standing Plie exercise handout  Assessment: Tolerated treatment well  Pt demonstrating good breathe control and form during there-ex this visit  She would benefit new IE following her surgery due to change in medical status  Plan: Continue per plan of care

## 2019-01-11 ENCOUNTER — TELEPHONE (OUTPATIENT)
Dept: OBGYN CLINIC | Facility: MEDICAL CENTER | Age: 54
End: 2019-01-11

## 2019-01-11 NOTE — TELEPHONE ENCOUNTER
Pt called and stated that she is suffering from rectal pain and severe constipation  She stated that is has been going on since this morning  Pt states she took three different types of laxatives and now liquid is coming out of her  However, she states that she could feel "a large stool" in her rectal cavity that wont release  Pt was distraught and sounded to be in severe pain on the phone  Consulted with our  Germán Page and she recommended the patient call her family doctor or if she is in that much pain to go to the ER  Pt said that she would head to the ER immediately

## 2019-02-21 ENCOUNTER — TRANSCRIBE ORDERS (OUTPATIENT)
Dept: ADMINISTRATIVE | Facility: HOSPITAL | Age: 54
End: 2019-02-21

## 2019-02-21 DIAGNOSIS — R53.83 OTHER FATIGUE: ICD-10-CM

## 2019-02-21 DIAGNOSIS — R63.5 ABNORMAL WEIGHT GAIN: ICD-10-CM

## 2019-02-21 DIAGNOSIS — N95.1 SYMPTOMATIC MENOPAUSAL OR FEMALE CLIMACTERIC STATES: Primary | ICD-10-CM

## 2019-02-21 DIAGNOSIS — R68.82 DECREASED LIBIDO: ICD-10-CM

## 2019-02-28 ENCOUNTER — APPOINTMENT (OUTPATIENT)
Dept: LAB | Facility: HOSPITAL | Age: 54
End: 2019-02-28
Payer: COMMERCIAL

## 2019-02-28 ENCOUNTER — HOSPITAL ENCOUNTER (OUTPATIENT)
Dept: BONE DENSITY | Facility: HOSPITAL | Age: 54
Discharge: HOME | End: 2019-02-28
Payer: COMMERCIAL

## 2019-02-28 DIAGNOSIS — R68.82 DECREASED LIBIDO: ICD-10-CM

## 2019-02-28 DIAGNOSIS — R53.83 OTHER FATIGUE: ICD-10-CM

## 2019-02-28 DIAGNOSIS — R63.5 ABNORMAL WEIGHT GAIN: ICD-10-CM

## 2019-02-28 DIAGNOSIS — N95.1 SYMPTOMATIC MENOPAUSAL OR FEMALE CLIMACTERIC STATES: ICD-10-CM

## 2019-02-28 LAB
25(OH)D3 SERPL-MCNC: 30.4 NG/ML (ref 30–100)
ALBUMIN SERPL BCP-MCNC: 4.2 G/DL (ref 3.5–5.7)
ALP SERPL-CCNC: 41 U/L (ref 40–150)
ALT SERPL W P-5'-P-CCNC: 18 U/L (ref 7–52)
ANION GAP SERPL CALCULATED.3IONS-SCNC: 7 MMOL/L (ref 4–13)
AST SERPL W P-5'-P-CCNC: 18 U/L (ref 13–39)
BILIRUB SERPL-MCNC: 0.5 MG/DL (ref 0.2–1)
BUN SERPL-MCNC: 21 MG/DL (ref 7–25)
CALCIUM SERPL-MCNC: 9.4 MG/DL (ref 8.6–10.5)
CHLORIDE SERPL-SCNC: 106 MMOL/L (ref 98–107)
CHOLEST SERPL-MCNC: 151 MG/DL (ref 0–200)
CO2 SERPL-SCNC: 28 MMOL/L (ref 21–31)
CREAT SERPL-MCNC: 1.05 MG/DL (ref 0.6–1.2)
ERYTHROCYTE [DISTWIDTH] IN BLOOD BY AUTOMATED COUNT: 12.9 % (ref 11.5–14.5)
EST. AVERAGE GLUCOSE BLD GHB EST-MCNC: 108 MG/DL
ESTRADIOL SERPL-MCNC: 93 PG/ML
FSH SERPL-ACNC: 15.9 MIU/ML
GFR SERPL CREATININE-BSD FRML MDRD: 60 ML/MIN/1.73SQ M
GLUCOSE P FAST SERPL-MCNC: 71 MG/DL (ref 65–99)
HBA1C MFR BLD: 5.4 % (ref 4.2–6.3)
HCT VFR BLD AUTO: 43 % (ref 42–47)
HDLC SERPL-MCNC: 40 MG/DL (ref 40–60)
HGB BLD-MCNC: 14.4 G/DL (ref 12–16)
INSULIN SERPL-ACNC: 14.6 MU/L (ref 3–25)
LDLC SERPL CALC-MCNC: 89 MG/DL (ref 75–193)
MCH RBC QN AUTO: 30.6 PG (ref 26–34)
MCHC RBC AUTO-ENTMCNC: 33.6 G/DL (ref 31–37)
MCV RBC AUTO: 91 FL (ref 81–99)
NONHDLC SERPL-MCNC: 111 MG/DL
PLATELET # BLD AUTO: 178 THOUSANDS/UL (ref 149–390)
PMV BLD AUTO: 9.6 FL (ref 8.6–11.7)
POTASSIUM SERPL-SCNC: 3.7 MMOL/L (ref 3.5–5.5)
PROGEST SERPL-MCNC: 0.9 NG/ML
PROT SERPL-MCNC: 6.8 G/DL (ref 6.4–8.9)
RBC # BLD AUTO: 4.71 MILLION/UL (ref 3.9–5.2)
SODIUM SERPL-SCNC: 141 MMOL/L (ref 134–143)
T3FREE SERPL-MCNC: 2.8 PG/ML (ref 2.3–4.2)
T4 FREE SERPL-MCNC: 0.9 NG/DL (ref 0.76–1.46)
T4 SERPL-MCNC: 8.5 UG/DL (ref 4.7–13.3)
TESTOST SERPL-MCNC: 16 NG/DL
TRIGL SERPL-MCNC: 112 MG/DL (ref 44–166)
TSH SERPL DL<=0.05 MIU/L-ACNC: 1.33 UIU/ML (ref 0.45–5.33)
WBC # BLD AUTO: 6.4 THOUSAND/UL (ref 4.8–10.8)

## 2019-02-28 PROCEDURE — 82626 DEHYDROEPIANDROSTERONE: CPT

## 2019-02-28 PROCEDURE — 82670 ASSAY OF TOTAL ESTRADIOL: CPT

## 2019-02-28 PROCEDURE — 83525 ASSAY OF INSULIN: CPT

## 2019-02-28 PROCEDURE — 84481 FREE ASSAY (FT-3): CPT

## 2019-02-28 PROCEDURE — 77080 DXA BONE DENSITY AXIAL: CPT

## 2019-02-28 PROCEDURE — 84144 ASSAY OF PROGESTERONE: CPT

## 2019-02-28 PROCEDURE — 84443 ASSAY THYROID STIM HORMONE: CPT

## 2019-02-28 PROCEDURE — 80061 LIPID PANEL: CPT

## 2019-02-28 PROCEDURE — 83036 HEMOGLOBIN GLYCOSYLATED A1C: CPT

## 2019-02-28 PROCEDURE — 36415 COLL VENOUS BLD VENIPUNCTURE: CPT

## 2019-02-28 PROCEDURE — 83001 ASSAY OF GONADOTROPIN (FSH): CPT

## 2019-02-28 PROCEDURE — 82306 VITAMIN D 25 HYDROXY: CPT

## 2019-02-28 PROCEDURE — 85027 COMPLETE CBC AUTOMATED: CPT

## 2019-02-28 PROCEDURE — 84439 ASSAY OF FREE THYROXINE: CPT

## 2019-02-28 PROCEDURE — 84403 ASSAY OF TOTAL TESTOSTERONE: CPT

## 2019-02-28 PROCEDURE — 80053 COMPREHEN METABOLIC PANEL: CPT

## 2019-03-05 LAB — DHEA SERPL-MCNC: 125 NG/DL (ref 31–701)

## 2019-06-06 ENCOUNTER — HOSPITAL ENCOUNTER (OUTPATIENT)
Dept: MAMMOGRAPHY | Facility: MEDICAL CENTER | Age: 54
Discharge: HOME | End: 2019-06-06
Payer: COMMERCIAL

## 2019-06-06 VITALS — WEIGHT: 178 LBS | HEIGHT: 67 IN | BODY MASS INDEX: 27.94 KG/M2

## 2019-06-06 DIAGNOSIS — Z12.39 SCREENING FOR MALIGNANT NEOPLASM OF BREAST: ICD-10-CM

## 2019-06-06 PROCEDURE — 77067 SCR MAMMO BI INCL CAD: CPT

## 2019-06-06 PROCEDURE — 77063 BREAST TOMOSYNTHESIS BI: CPT

## 2019-07-18 ENCOUNTER — TRANSCRIBE ORDERS (OUTPATIENT)
Dept: LAB | Facility: HOSPITAL | Age: 54
End: 2019-07-18

## 2019-07-22 ENCOUNTER — TRANSCRIBE ORDERS (OUTPATIENT)
Dept: ADMINISTRATIVE | Facility: HOSPITAL | Age: 54
End: 2019-07-22

## 2019-07-22 ENCOUNTER — APPOINTMENT (OUTPATIENT)
Dept: LAB | Facility: HOSPITAL | Age: 54
End: 2019-07-22
Payer: COMMERCIAL

## 2019-07-22 DIAGNOSIS — R63.5 ABNORMAL WEIGHT GAIN: ICD-10-CM

## 2019-07-22 DIAGNOSIS — R53.83 FATIGUE, UNSPECIFIED TYPE: ICD-10-CM

## 2019-07-22 DIAGNOSIS — R68.82 DECREASED LIBIDO: ICD-10-CM

## 2019-07-22 DIAGNOSIS — N95.1 SYMPTOMATIC MENOPAUSAL OR FEMALE CLIMACTERIC STATES: Primary | ICD-10-CM

## 2019-07-22 DIAGNOSIS — Z79.890 NEED FOR PROPHYLACTIC HORMONE REPLACEMENT THERAPY (POSTMENOPAUSAL): ICD-10-CM

## 2019-07-22 DIAGNOSIS — N95.1 SYMPTOMATIC MENOPAUSAL OR FEMALE CLIMACTERIC STATES: ICD-10-CM

## 2019-07-22 LAB
ESTRADIOL SERPL-MCNC: 13 PG/ML
PROGEST SERPL-MCNC: 1.6 NG/ML
TESTOST SERPL-MCNC: 16 NG/DL

## 2019-07-22 PROCEDURE — 84144 ASSAY OF PROGESTERONE: CPT

## 2019-07-22 PROCEDURE — 82679 ASSAY OF ESTRONE: CPT

## 2019-07-22 PROCEDURE — 36415 COLL VENOUS BLD VENIPUNCTURE: CPT

## 2019-07-22 PROCEDURE — 82626 DEHYDROEPIANDROSTERONE: CPT

## 2019-07-22 PROCEDURE — 82670 ASSAY OF TOTAL ESTRADIOL: CPT

## 2019-07-22 PROCEDURE — 84403 ASSAY OF TOTAL TESTOSTERONE: CPT

## 2019-07-24 LAB
DHEA SERPL-MCNC: 220 NG/DL (ref 31–701)
ESTRONE SERPL-MCNC: 36 PG/ML

## 2020-03-17 ENCOUNTER — OFFICE VISIT (OUTPATIENT)
Dept: OBGYN CLINIC | Facility: CLINIC | Age: 55
End: 2020-03-17
Payer: COMMERCIAL

## 2020-03-17 ENCOUNTER — APPOINTMENT (OUTPATIENT)
Dept: RADIOLOGY | Facility: CLINIC | Age: 55
End: 2020-03-17
Payer: COMMERCIAL

## 2020-03-17 VITALS
SYSTOLIC BLOOD PRESSURE: 100 MMHG | BODY MASS INDEX: 27.72 KG/M2 | TEMPERATURE: 98.1 F | DIASTOLIC BLOOD PRESSURE: 78 MMHG | WEIGHT: 177 LBS

## 2020-03-17 DIAGNOSIS — M25.561 ACUTE PAIN OF RIGHT KNEE: ICD-10-CM

## 2020-03-17 DIAGNOSIS — M17.11 PRIMARY LOCALIZED OSTEOARTHROSIS OF RIGHT LOWER LEG: ICD-10-CM

## 2020-03-17 DIAGNOSIS — M17.11 OSTEOARTHROSIS, LOCALIZED, PRIMARY, KNEE, RIGHT: Primary | ICD-10-CM

## 2020-03-17 PROBLEM — M17.12 PRIMARY LOCALIZED OSTEOARTHRITIS OF LEFT KNEE: Status: ACTIVE | Noted: 2020-03-17

## 2020-03-17 PROCEDURE — 99203 OFFICE O/P NEW LOW 30 MIN: CPT | Performed by: ORTHOPAEDIC SURGERY

## 2020-03-17 PROCEDURE — 73562 X-RAY EXAM OF KNEE 3: CPT

## 2020-03-17 PROCEDURE — 20610 DRAIN/INJ JOINT/BURSA W/O US: CPT | Performed by: ORTHOPAEDIC SURGERY

## 2020-03-17 RX ORDER — LIDOCAINE HYDROCHLORIDE 10 MG/ML
5 INJECTION, SOLUTION INFILTRATION; PERINEURAL
Status: COMPLETED | OUTPATIENT
Start: 2020-03-17 | End: 2020-03-17

## 2020-03-17 RX ORDER — MELOXICAM 15 MG/1
15 TABLET ORAL DAILY
COMMUNITY
Start: 2020-02-28 | End: 2020-03-25

## 2020-03-17 RX ORDER — BETAMETHASONE SODIUM PHOSPHATE AND BETAMETHASONE ACETATE 3; 3 MG/ML; MG/ML
6 INJECTION, SUSPENSION INTRA-ARTICULAR; INTRALESIONAL; INTRAMUSCULAR; SOFT TISSUE
Status: COMPLETED | OUTPATIENT
Start: 2020-03-17 | End: 2020-03-17

## 2020-03-17 RX ADMIN — BETAMETHASONE SODIUM PHOSPHATE AND BETAMETHASONE ACETATE 6 MG: 3; 3 INJECTION, SUSPENSION INTRA-ARTICULAR; INTRALESIONAL; INTRAMUSCULAR; SOFT TISSUE at 11:40

## 2020-03-17 RX ADMIN — LIDOCAINE HYDROCHLORIDE 5 ML: 10 INJECTION, SOLUTION INFILTRATION; PERINEURAL at 11:40

## 2020-03-17 NOTE — PROGRESS NOTES
Assessment:     1  Osteoarthrosis, localized, primary, knee, right    2  Primary localized osteoarthrosis of right lower leg          Plan:     Problem List Items Addressed This Visit        Musculoskeletal and Integument    Osteoarthrosis, localized, primary, knee, right - Primary     24-year-old female with aggravated osteoarthritis of the right knee  I reviewed the diagnosis with her  Today I am not seeing signs of any ligament instability or meniscal tears  Her knee was aspirated and a cortisone injection was given  She was counseled on icing  I referred her to physical therapy  She will follow up with me if she fails to have significant improvement in the next 6-8 weeks  Relevant Orders    Ambulatory referral to Physical Therapy           Patient ID: Izaiah Lopez is a 62 y o  female  Chief Complaint:  Knee pain    HPI:    24-year-old female here today for her right knee  She fell snowboarding on two different occasions this year  The 2nd time she had a hard bump in felt a pulling in the back of her knee  She did not fall and continued boarding  She did have trouble putting weight on the back of her heels  Since that time she has been icing it  She has been taking Mobic  Her pain has improved a lot  She found that doing some sidestepping and pulling some chairs cause pain again  She notes swelling in the knee  She has pain initially on the lateral aspect of the knee but now it is more medial   She feels stretching sensations in the knee  She has difficulty getting comfortable  She is looking to see what can be done for it  She is very active doing mountain biking in boarding as well as other physical activities  Allergy:  No Known Allergies    Medications:  all current active meds have been reviewed    Past Medical History:  History reviewed  No pertinent past medical history      Past Surgical History:  Past Surgical History:   Procedure Laterality Date    HYSTERECTOMY  age 35 Family History:  History reviewed  No pertinent family history  Social History:  Social History     Substance and Sexual Activity   Alcohol Use No     Social History     Substance and Sexual Activity   Drug Use No     Social History     Tobacco Use   Smoking Status Never Smoker   Smokeless Tobacco Never Used           ROS:  Review of Systems   Constitutional: Negative  HENT: Negative  Eyes: Negative  Respiratory: Negative  Cardiovascular: Negative  Gastrointestinal: Negative  Endocrine: Negative  Genitourinary: Negative  Musculoskeletal: Positive for arthralgias and joint swelling  Skin: Negative  Allergic/Immunologic: Negative  Neurological: Negative  Hematological: Negative  Psychiatric/Behavioral: Negative  Objective:  BP Readings from Last 1 Encounters:   03/17/20 100/78      Wt Readings from Last 1 Encounters:   03/17/20 80 3 kg (177 lb)        BMI:   Estimated body mass index is 27 72 kg/m² as calculated from the following:    Height as of 6/6/19: 5' 7" (1 702 m)  Weight as of this encounter: 80 3 kg (177 lb)  EXAM:   Physical Exam   Constitutional: She appears well-developed and well-nourished  No distress  HENT:   Head: Normocephalic and atraumatic  Eyes: Right eye exhibits no discharge  Left eye exhibits no discharge  Neck: Normal range of motion  Neck supple  No tracheal deviation present  Cardiovascular: Normal rate and regular rhythm  Pulmonary/Chest: Effort normal and breath sounds normal  No respiratory distress  She exhibits no tenderness  Abdominal: Soft  She exhibits no distension  There is no tenderness  Musculoskeletal:        Right knee: She exhibits effusion  Left knee: She exhibits no effusion  Neurological: She is alert  Skin: Skin is warm and dry  She is not diaphoretic  No erythema  Psychiatric: She has a normal mood and affect  Her behavior is normal    Vitals reviewed        Right Knee Exam     Muscle Strength   The patient has normal right knee strength  Tenderness   The patient is experiencing tenderness in the medial joint line, lateral joint line and patella  Range of Motion   Extension: normal   Flexion: 120     Tests   Shivani:  Medial - negative Lateral - negative  Varus: negative Valgus: negative  Lachman:  Anterior - negative      Drawer:  Posterior - negative  Pivot shift: negative  Patellar apprehension: negative    Other   Erythema: absent  Sensation: normal  Pulse: present  Swelling: none  Effusion: effusion present      Left Knee Exam   Left knee exam is normal     Tenderness   The patient is experiencing no tenderness  Range of Motion   The patient has normal left knee ROM  Tests   Shivani:  Medial - negative Lateral - negative  Varus: negative Valgus: negative  Lachman:  Anterior - negative      Drawer:  Posterior - negative  Pivot shift: negative  Patellar apprehension: negative    Other   Erythema: absent  Sensation: normal  Pulse: present  Swelling: none  Effusion: no effusion present              Radiographs:  I have personally reviewed pertinent films in PACS and my interpretation is Severe osteoarthritis of the patellofemoral joint with complete joint space loss  There is also calcification posterior to the joint consistent with possible old injury       Large joint arthrocentesis: R knee  Date/Time: 3/17/2020 11:40 AM  Consent given by: patient  Timeout: Immediately prior to procedure a time out was called to verify the correct patient, procedure, equipment, support staff and site/side marked as required   Supporting Documentation  Indications: pain   Procedure Details  Location: knee - R knee  Preparation: Patient was prepped and draped in the usual sterile fashion  Needle size: 18 G  Ultrasound guidance: no  Approach: superior  Medications administered: 6 mg betamethasone acetate-betamethasone sodium phosphate 6 (3-3) mg/mL; 5 mL lidocaine 1 %    Aspirate amount: 45 mL  Aspirate: yellow    Patient tolerance: patient tolerated the procedure well with no immediate complications  Dressing:  Sterile dressing applied

## 2020-03-17 NOTE — ASSESSMENT & PLAN NOTE
57-year-old female with aggravated osteoarthritis of the right knee  I reviewed the diagnosis with her  Today I am not seeing signs of any ligament instability or meniscal tears  Her knee was aspirated and a cortisone injection was given  She was counseled on icing  I referred her to physical therapy  She will follow up with me if she fails to have significant improvement in the next 6-8 weeks

## 2020-03-24 ENCOUNTER — TELEPHONE (OUTPATIENT)
Dept: OBGYN CLINIC | Facility: HOSPITAL | Age: 55
End: 2020-03-24

## 2020-03-25 DIAGNOSIS — M17.11 OSTEOARTHROSIS, LOCALIZED, PRIMARY, KNEE, RIGHT: Primary | ICD-10-CM

## 2020-03-25 RX ORDER — MELOXICAM 7.5 MG/1
15 TABLET ORAL DAILY
Qty: 30 TABLET | Refills: 1 | Status: SHIPPED | OUTPATIENT
Start: 2020-03-25 | End: 2020-12-08 | Stop reason: SDUPTHER

## 2020-06-02 ENCOUNTER — ANNUAL EXAM (OUTPATIENT)
Dept: OBGYN CLINIC | Facility: MEDICAL CENTER | Age: 55
End: 2020-06-02
Payer: COMMERCIAL

## 2020-06-02 VITALS
WEIGHT: 173.6 LBS | HEIGHT: 64 IN | SYSTOLIC BLOOD PRESSURE: 100 MMHG | DIASTOLIC BLOOD PRESSURE: 64 MMHG | BODY MASS INDEX: 29.64 KG/M2

## 2020-06-02 DIAGNOSIS — Z01.419 ENCOUNTER FOR WELL WOMAN EXAM WITH ROUTINE GYNECOLOGICAL EXAM: Primary | ICD-10-CM

## 2020-06-02 DIAGNOSIS — Z12.31 SCREENING MAMMOGRAM, ENCOUNTER FOR: ICD-10-CM

## 2020-06-02 PROCEDURE — S0612 ANNUAL GYNECOLOGICAL EXAMINA: HCPCS | Performed by: OBSTETRICS & GYNECOLOGY

## 2020-10-09 ENCOUNTER — OFFICE VISIT (OUTPATIENT)
Dept: OBGYN CLINIC | Facility: CLINIC | Age: 55
End: 2020-10-09
Payer: COMMERCIAL

## 2020-10-09 VITALS
SYSTOLIC BLOOD PRESSURE: 132 MMHG | TEMPERATURE: 98.9 F | HEART RATE: 69 BPM | WEIGHT: 173 LBS | BODY MASS INDEX: 29.53 KG/M2 | DIASTOLIC BLOOD PRESSURE: 80 MMHG | HEIGHT: 64 IN

## 2020-10-09 DIAGNOSIS — M17.11 OSTEOARTHROSIS, LOCALIZED, PRIMARY, KNEE, RIGHT: Primary | ICD-10-CM

## 2020-10-09 PROCEDURE — 99213 OFFICE O/P EST LOW 20 MIN: CPT | Performed by: ORTHOPAEDIC SURGERY

## 2020-10-14 ENCOUNTER — EVALUATION (OUTPATIENT)
Dept: PHYSICAL THERAPY | Facility: CLINIC | Age: 55
End: 2020-10-14
Payer: COMMERCIAL

## 2020-10-14 DIAGNOSIS — M17.11 PRIMARY LOCALIZED OSTEOARTHROSIS OF RIGHT LOWER LEG: Primary | ICD-10-CM

## 2020-10-14 DIAGNOSIS — M25.561 RIGHT KNEE PAIN, UNSPECIFIED CHRONICITY: ICD-10-CM

## 2020-10-14 PROCEDURE — 97161 PT EVAL LOW COMPLEX 20 MIN: CPT | Performed by: PHYSICAL THERAPIST

## 2020-10-14 PROCEDURE — 97112 NEUROMUSCULAR REEDUCATION: CPT | Performed by: PHYSICAL THERAPIST

## 2020-10-14 PROCEDURE — 97110 THERAPEUTIC EXERCISES: CPT | Performed by: PHYSICAL THERAPIST

## 2020-10-19 ENCOUNTER — OFFICE VISIT (OUTPATIENT)
Dept: PHYSICAL THERAPY | Facility: CLINIC | Age: 55
End: 2020-10-19
Payer: COMMERCIAL

## 2020-10-19 DIAGNOSIS — M25.561 RIGHT KNEE PAIN, UNSPECIFIED CHRONICITY: ICD-10-CM

## 2020-10-19 DIAGNOSIS — M17.11 PRIMARY LOCALIZED OSTEOARTHROSIS OF RIGHT LOWER LEG: Primary | ICD-10-CM

## 2020-10-19 PROCEDURE — 97530 THERAPEUTIC ACTIVITIES: CPT

## 2020-10-19 PROCEDURE — 97112 NEUROMUSCULAR REEDUCATION: CPT

## 2020-10-19 PROCEDURE — 97110 THERAPEUTIC EXERCISES: CPT

## 2020-10-21 ENCOUNTER — APPOINTMENT (OUTPATIENT)
Dept: PHYSICAL THERAPY | Facility: CLINIC | Age: 55
End: 2020-10-21
Payer: COMMERCIAL

## 2020-10-26 ENCOUNTER — OFFICE VISIT (OUTPATIENT)
Dept: PHYSICAL THERAPY | Facility: CLINIC | Age: 55
End: 2020-10-26
Payer: COMMERCIAL

## 2020-10-26 DIAGNOSIS — M25.561 RIGHT KNEE PAIN, UNSPECIFIED CHRONICITY: ICD-10-CM

## 2020-10-26 DIAGNOSIS — M17.11 PRIMARY LOCALIZED OSTEOARTHROSIS OF RIGHT LOWER LEG: Primary | ICD-10-CM

## 2020-10-26 PROCEDURE — 97530 THERAPEUTIC ACTIVITIES: CPT

## 2020-10-26 PROCEDURE — 97112 NEUROMUSCULAR REEDUCATION: CPT

## 2020-10-26 PROCEDURE — 97110 THERAPEUTIC EXERCISES: CPT

## 2020-10-28 ENCOUNTER — OFFICE VISIT (OUTPATIENT)
Dept: PHYSICAL THERAPY | Facility: CLINIC | Age: 55
End: 2020-10-28
Payer: COMMERCIAL

## 2020-10-28 DIAGNOSIS — M17.11 PRIMARY LOCALIZED OSTEOARTHROSIS OF RIGHT LOWER LEG: Primary | ICD-10-CM

## 2020-10-28 DIAGNOSIS — M25.561 RIGHT KNEE PAIN, UNSPECIFIED CHRONICITY: ICD-10-CM

## 2020-10-28 PROCEDURE — 97112 NEUROMUSCULAR REEDUCATION: CPT | Performed by: PHYSICAL THERAPIST

## 2020-10-28 PROCEDURE — 97110 THERAPEUTIC EXERCISES: CPT | Performed by: PHYSICAL THERAPIST

## 2020-11-02 ENCOUNTER — APPOINTMENT (OUTPATIENT)
Dept: PHYSICAL THERAPY | Facility: CLINIC | Age: 55
End: 2020-11-02
Payer: COMMERCIAL

## 2020-11-04 ENCOUNTER — APPOINTMENT (OUTPATIENT)
Dept: PHYSICAL THERAPY | Facility: CLINIC | Age: 55
End: 2020-11-04
Payer: COMMERCIAL

## 2020-11-09 ENCOUNTER — OFFICE VISIT (OUTPATIENT)
Dept: PHYSICAL THERAPY | Facility: CLINIC | Age: 55
End: 2020-11-09
Payer: COMMERCIAL

## 2020-11-09 DIAGNOSIS — M25.561 RIGHT KNEE PAIN, UNSPECIFIED CHRONICITY: ICD-10-CM

## 2020-11-09 DIAGNOSIS — M17.11 PRIMARY LOCALIZED OSTEOARTHROSIS OF RIGHT LOWER LEG: Primary | ICD-10-CM

## 2020-11-09 PROCEDURE — 97112 NEUROMUSCULAR REEDUCATION: CPT | Performed by: PHYSICAL THERAPIST

## 2020-11-09 PROCEDURE — 97110 THERAPEUTIC EXERCISES: CPT | Performed by: PHYSICAL THERAPIST

## 2020-11-11 ENCOUNTER — APPOINTMENT (OUTPATIENT)
Dept: PHYSICAL THERAPY | Facility: CLINIC | Age: 55
End: 2020-11-11
Payer: COMMERCIAL

## 2020-11-16 ENCOUNTER — EVALUATION (OUTPATIENT)
Dept: PHYSICAL THERAPY | Facility: CLINIC | Age: 55
End: 2020-11-16
Payer: COMMERCIAL

## 2020-11-16 DIAGNOSIS — M17.11 PRIMARY LOCALIZED OSTEOARTHROSIS OF RIGHT LOWER LEG: Primary | ICD-10-CM

## 2020-11-16 DIAGNOSIS — M25.561 RIGHT KNEE PAIN, UNSPECIFIED CHRONICITY: ICD-10-CM

## 2020-11-16 PROCEDURE — 97110 THERAPEUTIC EXERCISES: CPT | Performed by: PHYSICAL THERAPIST

## 2020-11-16 PROCEDURE — 97112 NEUROMUSCULAR REEDUCATION: CPT | Performed by: PHYSICAL THERAPIST

## 2020-11-23 ENCOUNTER — OFFICE VISIT (OUTPATIENT)
Dept: PHYSICAL THERAPY | Facility: CLINIC | Age: 55
End: 2020-11-23
Payer: COMMERCIAL

## 2020-11-23 DIAGNOSIS — M25.561 RIGHT KNEE PAIN, UNSPECIFIED CHRONICITY: ICD-10-CM

## 2020-11-23 DIAGNOSIS — M17.11 PRIMARY LOCALIZED OSTEOARTHROSIS OF RIGHT LOWER LEG: Primary | ICD-10-CM

## 2020-11-23 PROCEDURE — 97110 THERAPEUTIC EXERCISES: CPT

## 2020-11-23 PROCEDURE — 97530 THERAPEUTIC ACTIVITIES: CPT

## 2020-11-30 ENCOUNTER — OFFICE VISIT (OUTPATIENT)
Dept: PHYSICAL THERAPY | Facility: CLINIC | Age: 55
End: 2020-11-30
Payer: COMMERCIAL

## 2020-11-30 DIAGNOSIS — M17.11 PRIMARY LOCALIZED OSTEOARTHROSIS OF RIGHT LOWER LEG: Primary | ICD-10-CM

## 2020-11-30 DIAGNOSIS — M25.561 RIGHT KNEE PAIN, UNSPECIFIED CHRONICITY: ICD-10-CM

## 2020-11-30 PROCEDURE — 97110 THERAPEUTIC EXERCISES: CPT | Performed by: PHYSICAL THERAPIST

## 2020-11-30 PROCEDURE — 97530 THERAPEUTIC ACTIVITIES: CPT | Performed by: PHYSICAL THERAPIST

## 2020-11-30 PROCEDURE — 97112 NEUROMUSCULAR REEDUCATION: CPT | Performed by: PHYSICAL THERAPIST

## 2020-12-01 ENCOUNTER — OFFICE VISIT (OUTPATIENT)
Dept: OBGYN CLINIC | Facility: CLINIC | Age: 55
End: 2020-12-01
Payer: COMMERCIAL

## 2020-12-01 VITALS
HEIGHT: 64 IN | HEART RATE: 66 BPM | DIASTOLIC BLOOD PRESSURE: 82 MMHG | SYSTOLIC BLOOD PRESSURE: 123 MMHG | BODY MASS INDEX: 30.05 KG/M2 | WEIGHT: 176 LBS

## 2020-12-01 DIAGNOSIS — M17.11 OSTEOARTHROSIS, LOCALIZED, PRIMARY, KNEE, RIGHT: Primary | ICD-10-CM

## 2020-12-01 PROCEDURE — 20610 DRAIN/INJ JOINT/BURSA W/O US: CPT | Performed by: ORTHOPAEDIC SURGERY

## 2020-12-07 ENCOUNTER — OFFICE VISIT (OUTPATIENT)
Dept: PHYSICAL THERAPY | Facility: CLINIC | Age: 55
End: 2020-12-07
Payer: COMMERCIAL

## 2020-12-07 DIAGNOSIS — M25.561 RIGHT KNEE PAIN, UNSPECIFIED CHRONICITY: ICD-10-CM

## 2020-12-07 DIAGNOSIS — M17.11 PRIMARY LOCALIZED OSTEOARTHROSIS OF RIGHT LOWER LEG: Primary | ICD-10-CM

## 2020-12-07 PROCEDURE — 97110 THERAPEUTIC EXERCISES: CPT | Performed by: PHYSICAL THERAPIST

## 2020-12-07 PROCEDURE — 97112 NEUROMUSCULAR REEDUCATION: CPT | Performed by: PHYSICAL THERAPIST

## 2020-12-07 PROCEDURE — 97530 THERAPEUTIC ACTIVITIES: CPT | Performed by: PHYSICAL THERAPIST

## 2020-12-08 ENCOUNTER — OFFICE VISIT (OUTPATIENT)
Dept: OBGYN CLINIC | Facility: CLINIC | Age: 55
End: 2020-12-08
Payer: COMMERCIAL

## 2020-12-08 VITALS
HEIGHT: 64 IN | BODY MASS INDEX: 29.53 KG/M2 | DIASTOLIC BLOOD PRESSURE: 79 MMHG | WEIGHT: 173 LBS | SYSTOLIC BLOOD PRESSURE: 124 MMHG | HEART RATE: 75 BPM

## 2020-12-08 DIAGNOSIS — M17.11 OSTEOARTHROSIS, LOCALIZED, PRIMARY, KNEE, RIGHT: Primary | ICD-10-CM

## 2020-12-08 PROCEDURE — 20610 DRAIN/INJ JOINT/BURSA W/O US: CPT | Performed by: ORTHOPAEDIC SURGERY

## 2020-12-08 RX ORDER — MELOXICAM 7.5 MG/1
15 TABLET ORAL DAILY
Qty: 30 TABLET | Refills: 1 | Status: SHIPPED | OUTPATIENT
Start: 2020-12-08

## 2020-12-14 ENCOUNTER — EVALUATION (OUTPATIENT)
Dept: PHYSICAL THERAPY | Facility: CLINIC | Age: 55
End: 2020-12-14
Payer: COMMERCIAL

## 2020-12-14 DIAGNOSIS — M25.561 RIGHT KNEE PAIN, UNSPECIFIED CHRONICITY: ICD-10-CM

## 2020-12-14 DIAGNOSIS — M17.11 PRIMARY LOCALIZED OSTEOARTHROSIS OF RIGHT LOWER LEG: Primary | ICD-10-CM

## 2020-12-14 PROCEDURE — 97110 THERAPEUTIC EXERCISES: CPT | Performed by: PHYSICAL THERAPIST

## 2020-12-14 PROCEDURE — 97112 NEUROMUSCULAR REEDUCATION: CPT | Performed by: PHYSICAL THERAPIST

## 2020-12-15 ENCOUNTER — OFFICE VISIT (OUTPATIENT)
Dept: OBGYN CLINIC | Facility: CLINIC | Age: 55
End: 2020-12-15
Payer: COMMERCIAL

## 2020-12-15 VITALS — BODY MASS INDEX: 28.85 KG/M2 | WEIGHT: 169 LBS | HEIGHT: 64 IN

## 2020-12-15 DIAGNOSIS — M17.11 OSTEOARTHROSIS, LOCALIZED, PRIMARY, KNEE, RIGHT: Primary | ICD-10-CM

## 2020-12-15 PROCEDURE — 20610 DRAIN/INJ JOINT/BURSA W/O US: CPT | Performed by: ORTHOPAEDIC SURGERY

## 2021-04-15 NOTE — TELEPHONE ENCOUNTER
Please fax PT script to   [facility]  [address]  in [place]  Fax # [phone]    And please refill Meloxicam  Preferred pharmacy is [facility] in [address] [facility] # [phone]

## 2021-04-15 NOTE — TELEPHONE ENCOUNTER
Patient sees Dr Zion Iqbal  Patient called requesting medication refill of: meloxicam (MOBIC) 7 5 mg   [employer] on file  She also is requesting an updated PT referral for Right knee  She will be calling back with fax number since she is going to [facility]  in [facility]

## 2021-07-07 ENCOUNTER — TELEPHONE (OUTPATIENT)
Dept: OBGYN CLINIC | Facility: MEDICAL CENTER | Age: 56
End: 2021-07-07

## 2021-08-31 ENCOUNTER — TELEPHONE (OUTPATIENT)
Dept: OBGYN CLINIC | Facility: CLINIC | Age: 56
End: 2021-08-31

## 2021-08-31 NOTE — TELEPHONE ENCOUNTER
I had to leave a voicemail for the patient about rescheduling her appointment with Dr Sabas Chiu on 9/23 in the [facility]

## 2021-10-28 ENCOUNTER — TELEPHONE (OUTPATIENT)
Dept: OBGYN CLINIC | Facility: MEDICAL CENTER | Age: 56
End: 2021-10-28

## 2021-12-15 ENCOUNTER — ANNUAL EXAM (OUTPATIENT)
Dept: OBGYN CLINIC | Facility: CLINIC | Age: 56
End: 2021-12-15
Payer: COMMERCIAL

## 2021-12-15 VITALS
BODY MASS INDEX: 27 KG/M2 | DIASTOLIC BLOOD PRESSURE: 68 MMHG | SYSTOLIC BLOOD PRESSURE: 110 MMHG | WEIGHT: 172 LBS | HEIGHT: 67 IN

## 2021-12-15 DIAGNOSIS — Z12.31 SCREENING MAMMOGRAM, ENCOUNTER FOR: ICD-10-CM

## 2021-12-15 DIAGNOSIS — Z01.419 ENCOUNTER FOR WELL WOMAN EXAM WITH ROUTINE GYNECOLOGICAL EXAM: Primary | ICD-10-CM

## 2021-12-15 DIAGNOSIS — Z12.11 ENCOUNTER FOR SCREENING COLONOSCOPY: ICD-10-CM

## 2021-12-15 PROCEDURE — S0612 ANNUAL GYNECOLOGICAL EXAMINA: HCPCS | Performed by: OBSTETRICS & GYNECOLOGY

## 2022-01-25 ENCOUNTER — TELEPHONE (OUTPATIENT)
Dept: OBGYN CLINIC | Facility: MEDICAL CENTER | Age: 57
End: 2022-01-25

## 2022-01-25 NOTE — TELEPHONE ENCOUNTER
Patient called about an updated medication list and will be faxing it over and will like her medication sent to the [facility]

## 2022-03-03 ENCOUNTER — TELEPHONE (OUTPATIENT)
Dept: OBGYN CLINIC | Facility: MEDICAL CENTER | Age: 57
End: 2022-03-03

## 2022-03-03 NOTE — TELEPHONE ENCOUNTER
Patient called requesting refills for Testosterone 1% HRT Cream   Progesterone 150mg Caps  Biest 3mg/ml topical HRT Cream    Spoke to Dr Jose Montoya to confirm this medication can be refill for patient, after her approval I called [address] at [phone] to verbally authorize new prescription with 1 additional refill   Called patient to inform

## 2022-03-14 ENCOUNTER — TELEPHONE (OUTPATIENT)
Dept: GASTROENTEROLOGY | Facility: AMBULARY SURGERY CENTER | Age: 57
End: 2022-03-14

## 2022-03-14 ENCOUNTER — PREP FOR PROCEDURE (OUTPATIENT)
Dept: GASTROENTEROLOGY | Facility: AMBULARY SURGERY CENTER | Age: 57
End: 2022-03-14

## 2022-03-14 DIAGNOSIS — Z12.11 SPECIAL SCREENING FOR MALIGNANT NEOPLASMS, COLON: Primary | ICD-10-CM

## 2022-03-14 NOTE — TELEPHONE ENCOUNTER
Patient is scheduled for colonoscopy on April 27 , 2022 at [place] [address] with Denia Robles  Patient is aware of pre-procedure prep of Miralax/ Magnesium Citrate and they will be called the day prior between 2 and 6 pm for time to report for procedure  Pre-procedure prep has been given to the patient  via [address] [facility] mail and e-mail on March 14 , 2022

## 2022-03-31 ENCOUNTER — TELEPHONE (OUTPATIENT)
Dept: OBGYN CLINIC | Facility: MEDICAL CENTER | Age: 57
End: 2022-03-31

## 2022-03-31 NOTE — TELEPHONE ENCOUNTER
Patient called stating her prescription for Biest 3mg/ml topical HRT Cream is already finished in 15 days  She would like if possible, 2 more 15 day refills  Also stated she used to get a 12 months refills and would like that option again if possible  Please review when you get a chance, thank you

## 2022-04-22 ENCOUNTER — NURSE TRIAGE (OUTPATIENT)
Dept: OTHER | Facility: OTHER | Age: 57
End: 2022-04-22

## 2022-04-22 NOTE — TELEPHONE ENCOUNTER
Patient is saying she never received prep instructions for her Colonoscopy last week  Unsure which ones she received  Please advise       Reason for Disposition   [1] Caller requesting NON-URGENT health information AND [2] PCP's office is the best resource    Protocols used: INFORMATION ONLY CALL - NO TRIAGE-ADULT-

## 2022-04-22 NOTE — TELEPHONE ENCOUNTER
Left message notifying patient that I emailed the prep to the e-mail we have in there registration and to call back if they did not receive the prep

## 2022-04-22 NOTE — TELEPHONE ENCOUNTER
Regarding: Colonoscopy prep  ----- Message from [facility] sent at 4/22/2022  8:09 AM EDT -----  "I have a Colonoscopy scheduled next week and I never received the prep instructions   Please assist  I need to know what I can and cannot eat and what I am supposed to use to move my bowels "

## 2022-04-27 ENCOUNTER — ANESTHESIA EVENT (OUTPATIENT)
Dept: GASTROENTEROLOGY | Facility: HOSPITAL | Age: 57
End: 2022-04-27

## 2022-04-27 ENCOUNTER — HOSPITAL ENCOUNTER (OUTPATIENT)
Dept: GASTROENTEROLOGY | Facility: HOSPITAL | Age: 57
Setting detail: OUTPATIENT SURGERY
Discharge: HOME | End: 2022-04-27
Attending: STUDENT IN AN ORGANIZED HEALTH CARE EDUCATION/TRAINING PROGRAM | Admitting: STUDENT IN AN ORGANIZED HEALTH CARE EDUCATION/TRAINING PROGRAM
Payer: COMMERCIAL

## 2022-04-27 ENCOUNTER — ANESTHESIA (OUTPATIENT)
Dept: GASTROENTEROLOGY | Facility: HOSPITAL | Age: 57
End: 2022-04-27

## 2022-04-27 VITALS
SYSTOLIC BLOOD PRESSURE: 98 MMHG | HEART RATE: 61 BPM | HEIGHT: 67 IN | TEMPERATURE: 98.2 F | WEIGHT: 172 LBS | BODY MASS INDEX: 27 KG/M2 | RESPIRATION RATE: 20 BRPM | OXYGEN SATURATION: 99 % | DIASTOLIC BLOOD PRESSURE: 56 MMHG

## 2022-04-27 DIAGNOSIS — Z12.11 SPECIAL SCREENING FOR MALIGNANT NEOPLASMS, COLON: ICD-10-CM

## 2022-04-27 PROCEDURE — 88305 TISSUE EXAM BY PATHOLOGIST: CPT | Performed by: PATHOLOGY

## 2022-04-27 PROCEDURE — 45380 COLONOSCOPY AND BIOPSY: CPT | Performed by: STUDENT IN AN ORGANIZED HEALTH CARE EDUCATION/TRAINING PROGRAM

## 2022-04-27 RX ORDER — SODIUM CHLORIDE, SODIUM LACTATE, POTASSIUM CHLORIDE, CALCIUM CHLORIDE 600; 310; 30; 20 MG/100ML; MG/100ML; MG/100ML; MG/100ML
125 INJECTION, SOLUTION INTRAVENOUS CONTINUOUS
Status: DISCONTINUED | OUTPATIENT
Start: 2022-04-27 | End: 2022-05-01 | Stop reason: HOSPADM

## 2022-04-27 RX ORDER — LIDOCAINE HYDROCHLORIDE 10 MG/ML
INJECTION, SOLUTION EPIDURAL; INFILTRATION; INTRACAUDAL; PERINEURAL AS NEEDED
Status: DISCONTINUED | OUTPATIENT
Start: 2022-04-27 | End: 2022-04-27

## 2022-04-27 RX ORDER — PROPOFOL 10 MG/ML
INJECTION, EMULSION INTRAVENOUS AS NEEDED
Status: DISCONTINUED | OUTPATIENT
Start: 2022-04-27 | End: 2022-04-27

## 2022-04-27 RX ADMIN — PROPOFOL 50 MG: 10 INJECTION, EMULSION INTRAVENOUS at 11:11

## 2022-04-27 RX ADMIN — PROPOFOL 120 MG: 10 INJECTION, EMULSION INTRAVENOUS at 11:06

## 2022-04-27 RX ADMIN — PROPOFOL 50 MG: 10 INJECTION, EMULSION INTRAVENOUS at 11:27

## 2022-04-27 RX ADMIN — PROPOFOL 50 MG: 10 INJECTION, EMULSION INTRAVENOUS at 11:19

## 2022-04-27 RX ADMIN — LIDOCAINE HYDROCHLORIDE 40 MG: 10 INJECTION, SOLUTION EPIDURAL; INFILTRATION; INTRACAUDAL; PERINEURAL at 11:06

## 2022-04-27 RX ADMIN — SODIUM CHLORIDE, SODIUM LACTATE, POTASSIUM CHLORIDE, AND CALCIUM CHLORIDE 125 ML/HR: .6; .31; .03; .02 INJECTION, SOLUTION INTRAVENOUS at 10:19

## 2022-04-27 NOTE — ANESTHESIA POSTPROCEDURE EVALUATION
Post-Op Assessment Note    CV Status:  Stable  Pain Score: 0    Pain management: adequate     Mental Status:  Alert and awake   Hydration Status:  Euvolemic   PONV Controlled:  Controlled   Airway Patency:  Patent      Post Op Vitals Reviewed: Yes      Staff: CRNA, Anesthesiologist         No complications documented      BP   108/65   Temp      Pulse  66   Resp   16   SpO2   99

## 2022-04-27 NOTE — DISCHARGE INSTRUCTIONS
Colonoscopy   WHAT YOU NEED TO KNOW:   A colonoscopy is a procedure to examine the inside of your colon (intestine) with a scope  Polyps or tissue growths may have been removed during your colonoscopy  It is normal to feel bloated and to have some abdominal discomfort  You should be passing gas  If you have hemorrhoids or you had polyps removed, you may have a small amount of bleeding  DISCHARGE INSTRUCTIONS:   Seek care immediately if:    You have sudden, severe abdominal pain   You have problems swallowing   You have a large amount of black, sticky bowel movements or blood in your bowel movements   You have sudden trouble breathing   You feel weak, lightheaded, or faint or your heart beats faster than normal for you  Contact your healthcare provider if:    You have a fever and chills   You have nausea or are vomiting   Your abdomen is bloated or feels full and hard   You have abdominal pain   You have black, sticky bowel movements or blood in your bowel movements   You have not had a bowel movement for 3 days after your procedure   You have rash or hives   You have questions or concerns about your procedure  Activity:    Do not lift, strain, or run for 24 hours after your procedure   Rest after your procedure  You have been given medicine to relax you  Do not drive or make important decisions until the day after your procedure  Return to your normal activity as directed   Relieve gas and discomfort from bloating by lying on your right side with a heating pad on your abdomen  You may need to take short walks to help the gas move out  Eat small meals until bloating is relieved  Follow up with your healthcare provider as directed: Write down your questions so you remember to ask them during your visits  If you take a blood thinner, please review the specific instructions from your endoscopist about when you should resume it   These can be found in the Recommendation and Your Medication list sections of this After Visit Summary  Hemorrhoids   AMBULATORY CARE:   Hemorrhoids  are swollen blood vessels inside your rectum (internal hemorrhoids) or on your anus (external hemorrhoids)  Sometimes a hemorrhoid may prolapse  This means it extends out of your anus  Common symptoms include the following:   · Pain or itching around your anus or inside your rectum    · Swelling or bumps around your anus    · Bright red blood in your bowel movement, on the toilet paper, or in the toilet bowl    · Tissue bulging out of your anus (prolapsed hemorrhoids)    · Incontinence (poor control over urine or bowel movements)    Seek care immediately if:   · You have severe pain in your rectum or around your anus  · You have severe pain in your abdomen and you are vomiting  · You have bleeding from your anus that soaks through your underwear  Contact your healthcare provider if:   · You have frequent and painful bowel movements  · Your hemorrhoid looks or feels more swollen than usual      · You do not have a bowel movement for 2 days or more  · You see or feel tissue coming through your anus  · You have questions or concerns about your condition or care  Treatment for hemorrhoids  may include medicines to decrease pain, swelling, and itching  The medicine may be a pill, pad, cream, or ointment  Medicine may also be given to soften your bowel movement  Surgery and other procedures may be needed to shrink or remove your hemorrhoids  Manage your symptoms:   · Apply ice on your anus for 15 to 20 minutes every hour or as directed  Use an ice pack, or put crushed ice in a plastic bag  Cover it with a towel before you apply it to your anus  Ice helps prevent tissue damage and decreases swelling and pain  · Take a sitz bath  Fill a bathtub with 4 to 6 inches of warm water  You may also use a sitz bath pan that fits inside a toilet bowl   Sit in the sitz bath for 15 minutes  Do this 3 times a day, and after each bowel movement  The warm water can help decrease pain and swelling  · Keep your anal area clean  Gently wash the area with warm water daily  Soap may irritate the area  After a bowel movement, wipe with moist towelettes or wet toilet paper  Dry toilet paper can irritate the area  Prevent hemorrhoids:   · Do not strain to have a bowel movement  Do not sit on the toilet too long  These actions can increase pressure on the tissues in your rectum and anus  · Drink plenty of liquids  Liquids can help prevent constipation  Ask how much liquid to drink each day and which liquids are best for you  · Eat a variety of high-fiber foods  Examples include fruits, vegetables, and whole grains  Ask your healthcare provider how much fiber you need each day  You may need to take a fiber supplement  · Exercise as directed  Exercise, such as walking, may make it easier to have a bowel movement  Ask your healthcare provider to help you create an exercise plan  · Do not have anal sex  Anal sex can weaken the skin around your rectum and anus  · Avoid heavy lifting  This can cause straining and increase your risk for another hemorrhoid  Follow up with your doctor as directed:  Write down your questions so you remember to ask them during your visits  © Copyright [employer] 2022 Information is for End User's use only and may not be sold, redistributed or otherwise used for commercial purposes  All illustrations and images included in CareNotes® are the copyrighted property of [employer] Inc  or [facility] Jadiel Amor   The above information is an  only  It is not intended as medical advice for individual conditions or treatments  Talk to your doctor, nurse or pharmacist before following any medical regimen to see if it is safe and effective for you    High Fiber Diet   WHAT YOU NEED TO KNOW:   A high-fiber diet includes foods that have a high amount of fiber  Fiber is the part of fruits, vegetables, and grains that is not broken down by your body  Fiber keeps your bowel movements regular  Fiber can also help lower your cholesterol level, control blood sugar in people with diabetes, and relieve constipation  Fiber can also help you control your weight because it helps you feel full faster  Most adults should eat 25 to 35 grams of fiber each day  Talk to your dietitian or healthcare provider about the amount of fiber you need  DISCHARGE INSTRUCTIONS:   Good sources of fiber:       · Foods with at least 4 grams of fiber per serving:      ? ? to ½ cup of high-fiber cereal (check the nutrition label on the box)    ? ½ cup of blackberries or raspberries    ? 4 dried prunes    ? 1 cooked artichoke    ? ½ cup of cooked legumes, such as lentils, or red, kidney, and obrien beans    · Foods with 1 to 3 grams of fiber per serving:      ? 1 slice of whole-wheat, pumpernickel, or rye bread    ? ½ cup of cooked brown rice    ? 4 whole-wheat crackers    ? 1 cup of oatmeal    ? ½ cup of cereal with 1 to 3 grams of fiber per serving (check the nutrition label on the box)    ? 1 small piece of fruit, such as an apple, banana, pear, kiwi, or orange    ? 3 dates    ? ½ cup of canned apricots, fruit cocktail, peaches, or pears    ? ½ cup of raw or cooked vegetables, such as carrots, cauliflower, cabbage, spinach, squash, or corn    Ways that you can increase fiber in your diet:   · Choose brown or wild rice instead of white rice  · Use whole wheat flour in recipes instead of white or all-purpose flour  · Add beans and peas to casseroles or soups  · Choose fresh fruit and vegetables with peels or skins on instead of juices  Other diet guidelines to follow:   · Add fiber to your diet slowly  You may have abdominal discomfort, bloating, and gas if you add fiber to your diet too quickly       · Drink plenty of liquids as you add fiber to your diet   You may have nausea or develop constipation if you do not drink enough water  Ask how much liquid to drink each day and which liquids are best for you  © Copyright [employer] 2022 Information is for End User's use only and may not be sold, redistributed or otherwise used for commercial purposes  All illustrations and images included in CareNotes® are the copyrighted property of [employer]  or Cristina Amor   The above information is an  only  It is not intended as medical advice for individual conditions or treatments  Talk to your doctor, nurse or pharmacist before following any medical regimen to see if it is safe and effective for you  Diverticulosis   AMBULATORY CARE:   Diverticulosis  is a condition that causes small pockets called diverticula to form in your intestine  These pockets make it difficult for bowel movements to pass through your digestive system  Signs and symptoms:  Diverticulosis usually does not cause any signs or symptoms  It may cause any of the following in some people:  · Pain or discomfort in your lower abdomen    · Abdominal bloating    · Constipation or diarrhea    Seek care immediately if:   · You have severe pain on the left side of your lower abdomen  · Your bowel movements are bright or dark red  Call your doctor if:   · You have a fever and chills  · You feel dizzy or lightheaded  · You have nausea, or you are vomiting  · You have a change in your bowel movements  · You have questions or concerns about your condition or care  Treatment:  The goal of treatment is to manage any symptoms you have and prevent other problems such as diverticulitis  Diverticulitis is swelling or infection of the diverticula  Your healthcare provider may recommend any of the following:  · Eat a variety of high-fiber foods  High-fiber foods help you have regular bowel movements   High-fiber foods include cooked beans, fruits, vegetables, and some cereals  Most adults need 25 to 35 grams of fiber each day  Your healthcare provider may recommend that you have more  Ask your healthcare provider how much fiber you need  Increase fiber slowly  You may have abdominal discomfort, bloating, and gas if you add fiber to your diet too quickly  You may need to take a fiber supplement if you are not getting enough fiber from food  · Medicines  to soften your bowel movements may be given  You may also need medicines to treat symptoms such as bloating and pain  · Drink liquids as directed  You may need to drink 2 to 3 liters (8 to 12 cups) of liquids every day  Ask your healthcare provider how much liquid to drink each day and which liquids are best for you  · Apply heat  on your abdomen for 20 to 30 minutes every 2 hours for as many days as directed  Heat helps decrease pain and muscle spasms  Help prevent diverticulitis or other symptoms: The following may help decrease your risk for diverticulitis or symptoms, such as bleeding  Talk to your provider about these or other things you can do to prevent problems that may occur with diverticulosis  · Exercise regularly  Ask your healthcare provider about the best exercise plan for you  Exercise can help you have regular bowel movements  Get 30 minutes of exercise on most days of the week  · Maintain a healthy weight  Ask your healthcare provider what a healthy weight is for you  Ask him or her to help you create a weight loss plan if you are overweight  · Do not smoke  Nicotine and other chemicals in cigarettes increase your risk for diverticulitis  Ask your healthcare provider for information if you currently smoke and need help to quit  E-cigarettes or smokeless tobacco still contain nicotine  Talk to your healthcare provider before you use these products  · Ask your healthcare provider if it is safe to take NSAIDs  NSAIDs may increase your risk of diverticulitis      Follow up with your doctor as directed:  Write down your questions so you remember to ask them during your visits  © Copyright [employer] 2022 Information is for End User's use only and may not be sold, redistributed or otherwise used for commercial purposes  All illustrations and images included in CareNotes® are the copyrighted property of [employer] Inc  or Cristina Pham  The above information is an  only  It is not intended as medical advice for individual conditions or treatments  Talk to your doctor, nurse or pharmacist before following any medical regimen to see if it is safe and effective for you  Colorectal Polyps   AMBULATORY CARE:   Colorectal polyps  are small growths of tissue in the lining of the colon and rectum  Most polyps are usually benign (not cancer)  Certain types of polyps, called adenomatous polyps, may turn into cancer  Common signs and symptoms include the following:   · Blood in your bowel movement or bleeding from the rectum    · Change in bowel movement habits, such as diarrhea or constipation    · Abdominal pain    Call your local emergency number (911 in the [address] [facility]) if:   · You have sudden shortness of breath  · You have a fast heart rate, fast breathing, or are too dizzy to stand up  Seek care immediately if:   · You have severe abdominal pain  · You see blood in your bowel movement  Call your doctor or gastroenterologist if:   · You have a fever  · You have chills, a cough, or feel weak and achy  · You have abdominal pain that does not go away or gets worse after you take medicine  · Your abdomen is swollen  · You are losing weight without trying  · You have questions or concerns about your condition or care  What you need to know about colorectal polyp screening and diagnosis:  Screening means you are checked for polyps that may be cancer, even if you do not have signs or symptoms   Screening is recommended starting at age 48 and continuing to age 76 if you are at average risk  Your healthcare provider may suggest screening starting at age 39  Screening may start before you are 45 or continue after you are 75 if your risk is high  Your provider will tell you how often to get screened  Timing depends on the type of screening and if polyps are found  Timing also depends on your age and if you are at increased risk for cancer  Screening may be recommended every 1, 2, 5, or 10 years  Your healthcare provider will need to test polyps to find out if they are cancer  Any of the following may be used to find polyps:  · A digital rectal exam  means your provider will use a finger to check for polyps  · A barium enema  is an x-ray of the colon  A tube is put into your anus, and a liquid called barium is put through the tube  Barium is used so that healthcare providers can see your colon better on the x-ray film  · A virtual colonoscopy  is a CT scan that takes pictures of the inside of your colon and rectum  A small, flexible tube is put into your rectum and air or carbon dioxide (gas) is used to expand your colon  This lets healthcare providers clearly see your colon and any polyps on a monitor  · Colonoscopy or sigmoidoscopy  are procedures to help your provider see the inside of your colon  He or she will use a flexible tube with a small light and camera on the end  During a sigmoidoscopy, your provider will only look at rectum and lower colon  During a colonoscopy, he or she will look at the full length of your colon  A small amount of tissue may be removed from your colon for tests  Treatment  may not be needed if the polyp is small and benign  Your healthcare provider will check the polyp over time to make sure it is not changing  Polyps that are cancer may be removed with one of the following:  · A polypectomy  is a minimally invasive procedure to remove a polyp during a colonoscopy or sigmoidoscopy   Your healthcare provider may need to remove the polyp with a laparoscope  Laparoscopy is done by inserting a small, flexible scope into incisions made on your abdomen  · Surgery  may be needed to remove large or deep polyps that cannot be removed in a polypectomy  Tissues or lymph nodes near a polyp may also be removed  This helps stop cancer from spreading  Lower your risk for colorectal polyps:   · Eat a variety of healthy foods  Healthy foods include fruit, vegetables, whole-grain breads, low-fat dairy products, beans, lean meat, and fish  Ask if you need to be on a special diet  · Maintain a healthy weight  Ask your healthcare provider what a healthy weight is for you  Ask him or her to help you with a weight loss plan if you are overweight  · Exercise as directed  Begin to exercise slowly and do more as you get stronger  Talk with your healthcare provider before you start an exercise program          · Limit alcohol  Your risk for polyps increases the more you drink  · Do not smoke  Nicotine and other chemicals in cigarettes and cigars increase your risk for polyps  Ask your healthcare provider for information if you currently smoke and need help to quit  E-cigarettes or smokeless tobacco still contain nicotine  Talk to your healthcare provider before you use these products  Follow up with your doctor or gastroenterologist as directed: You may need to return for more tests, such as another colonoscopy  Write down your questions so you remember to ask them during your visits  © Copyright [employer] 2022 Information is for End User's use only and may not be sold, redistributed or otherwise used for commercial purposes  All illustrations and images included in CareNotes® are the copyrighted property of [employer]  or Cristina Amor   The above information is an  only  It is not intended as medical advice for individual conditions or treatments   Talk to your doctor, nurse or pharmacist before following any medical regimen to see if it is safe and effective for you

## 2022-04-27 NOTE — H&P
History and Physical - [facility]  Ebony Herrera 60 y o  female MRN: [id number]                  HPI: Ebony Herrera is a 60y o  year old female who presents for colon cancer screening      REVIEW OF SYSTEMS: Per the HPI, and otherwise unremarkable  Historical Information   History reviewed  No pertinent past medical history  Past Surgical History:   Procedure Laterality Date    BLADDER SUSPENSION      LAPAROSCOPIC SUPRACERVICAL HYSTERECTOMY      age 35     Social History   Social History     Substance and Sexual Activity   Alcohol Use No     Social History     Substance and Sexual Activity   Drug Use No     Social History     Tobacco Use   Smoking Status Never Smoker   Smokeless Tobacco Never Used     History reviewed  No pertinent family history  Meds/Allergies       Current Outpatient Medications:     meloxicam (MOBIC) 7 5 mg tablet    Current Facility-Administered Medications:     lactated ringers infusion, 125 mL/hr, Intravenous, Continuous, 125 mL/hr at 04/27/22 1019    No Known Allergies    Objective     /69   Pulse 69   Temp 98 3 °F (36 8 °C) (Temporal)   Resp 20   Ht 5' 7" (1 702 m)   Wt 78 kg (172 lb)   LMP  (LMP Unknown)   SpO2 98%   BMI 26 94 kg/m²       PHYSICAL EXAM    Gen: NAD  Head: NCAT  CV: RRR  CHEST: Clear  ABD: soft, NT/ND  EXT: no edema      ASSESSMENT/PLAN:  This is a 60y o  year old female here for colonoscopy, and she is stable and optimized for her procedure

## 2022-04-27 NOTE — ANESTHESIA PREPROCEDURE EVALUATION
Procedure:  COLONOSCOPY    Relevant Problems   MUSCULOSKELETAL   (+) Osteoarthrosis, localized, primary, knee, right        Physical Exam    Airway    Mallampati score: II  TM Distance: >3 FB  Neck ROM: full     Dental       Cardiovascular  Rhythm: regular, Rate: normal,     Pulmonary  Pulmonary exam normal Breath sounds clear to auscultation,     Other Findings        Anesthesia Plan  ASA Score- 1     Anesthesia Type- IV sedation with anesthesia with ASA Monitors  Additional Monitors:   Airway Plan:     Comment: I discussed the risks and benefits of IV sedation anesthesia including the possibility of the need to convert to general anesthesia and the potential risk of awareness  The patient was given the opportunity to ask questions, which were answered          Plan Factors-Exercise tolerance (METS): >4 METS  Chart reviewed  Patient is not a current smoker  Induction- intravenous  Postoperative Plan-     Informed Consent- Anesthetic plan and risks discussed with patient  I personally reviewed this patient with the CRNA  Discussed and agreed on the Anesthesia Plan with the CRNA  Vasyl Subramanian

## 2023-03-07 ENCOUNTER — OFFICE VISIT (OUTPATIENT)
Dept: URGENT CARE | Facility: CLINIC | Age: 58
End: 2023-03-07

## 2023-03-07 VITALS
BODY MASS INDEX: 25.27 KG/M2 | DIASTOLIC BLOOD PRESSURE: 80 MMHG | SYSTOLIC BLOOD PRESSURE: 131 MMHG | RESPIRATION RATE: 18 BRPM | HEART RATE: 72 BPM | HEIGHT: 67 IN | TEMPERATURE: 98.1 F | WEIGHT: 161 LBS | OXYGEN SATURATION: 95 %

## 2023-03-07 DIAGNOSIS — N89.8 VAGINAL IRRITATION: ICD-10-CM

## 2023-03-07 DIAGNOSIS — N39.0 URINARY TRACT INFECTION WITHOUT HEMATURIA, SITE UNSPECIFIED: Primary | ICD-10-CM

## 2023-03-07 DIAGNOSIS — R35.0 URINARY FREQUENCY: ICD-10-CM

## 2023-03-07 LAB
SL AMB  POCT GLUCOSE, UA: NEGATIVE
SL AMB LEUKOCYTE ESTERASE,UA: ABNORMAL
SL AMB POCT BILIRUBIN,UA: NEGATIVE
SL AMB POCT BLOOD,UA: ABNORMAL
SL AMB POCT CLARITY,UA: ABNORMAL
SL AMB POCT COLOR,UA: YELLOW
SL AMB POCT KETONES,UA: NEGATIVE
SL AMB POCT NITRITE,UA: NEGATIVE
SL AMB POCT PH,UA: 6
SL AMB POCT SPECIFIC GRAVITY,UA: 1.01
SL AMB POCT URINE PROTEIN: NEGATIVE
SL AMB POCT UROBILINOGEN: 0.2

## 2023-03-07 RX ORDER — FLUCONAZOLE 150 MG/1
150 TABLET ORAL ONCE
Qty: 1 TABLET | Refills: 1 | Status: SHIPPED | OUTPATIENT
Start: 2023-03-07 | End: 2023-03-07

## 2023-03-07 RX ORDER — NITROFURANTOIN 25; 75 MG/1; MG/1
100 CAPSULE ORAL 2 TIMES DAILY
Qty: 10 CAPSULE | Refills: 0 | Status: SHIPPED | OUTPATIENT
Start: 2023-03-07 | End: 2023-03-12

## 2023-03-07 NOTE — PROGRESS NOTES
[facility]        NAME: Loreta Lester is a 59 y o  female  : 1965    MRN: [id number]  DATE: 2023  TIME: 5:50 PM    Assessment and Plan   Urinary tract infection without hematuria, site unspecified [N39 0]  1  Urinary tract infection without hematuria, site unspecified  nitrofurantoin (MACROBID) 100 mg capsule      2  Urinary frequency  POCT urine dip    Urine culture      3  Vaginal irritation  fluconazole (DIFLUCAN) 150 mg tablet            Patient Instructions     Patient Instructions     Urine dip positive  Will send for culture  Call in 2-3 days for results  Start antibiotic  Take probiotic  Increase oral fluids  Tylenol or Motrin for pain or fever  Azo for bladder spasms  Follow up with PCP if no improvement  Go to ER with abd pain, flank pain or worsening symptoms  Will also treat for yeast infection based on symptoms  You can repeat in 72 hours if symptoms persist      Urinary Tract Infection in Women   WHAT YOU NEED TO KNOW:   A urinary tract infection (UTI) is caused by bacteria that get inside your urinary tract  Most bacteria that enter your urinary tract come out when you urinate  If the bacteria stay in your urinary tract, you may get an infection  Your urinary tract includes your kidneys, ureters, bladder, and urethra  Urine is made in your kidneys, and it flows from the ureters to the bladder  Urine leaves the bladder through the urethra  A UTI is more common in your lower urinary tract, which includes your bladder and urethra  DISCHARGE INSTRUCTIONS:   Return to the emergency department if:   · You are urinating very little or not at all  · You have a high fever with shaking chills  · You have side or back pain that gets worse  Contact your healthcare provider if:   · You have a fever  · You do not feel better after 2 days of taking antibiotics  · You are vomiting       · You have questions or concerns about your condition or care   Medicines:   · Antibiotics  help fight a bacterial infection  · Medicines  may be given to decrease pain and burning when you urinate  They will also help decrease the feeling that you need to urinate often  These medicines will make your urine orange or red  · Take your medicine as directed  Contact your healthcare provider if you think your medicine is not helping or if you have side effects  Tell him or her if you are allergic to any medicine  Keep a list of the medicines, vitamins, and herbs you take  Include the amounts, and when and why you take them  Bring the list or the pill bottles to follow-up visits  Carry your medicine list with you in case of an emergency  Follow up with your healthcare provider as directed:  Write down your questions so you remember to ask them during your visits  Prevent another UTI:   · Empty your bladder often  Urinate and empty your bladder as soon as you feel the need  Do not hold your urine for long periods of time  · Wipe from front to back after you urinate or have a bowel movement  This will help prevent germs from getting into your urinary tract through your urethra  · Drink liquids as directed  Ask how much liquid to drink each day and which liquids are best for you  You may need to drink more liquids than usual to help flush out the bacteria  Do not drink alcohol, caffeine, or citrus juices  These can irritate your bladder and increase your symptoms  Your healthcare provider may recommend cranberry juice to help prevent a UTI  · Urinate after you have sex  This can help flush out bacteria passed during sex  · Do not douche or use feminine deodorants  These can change the chemical balance in your vagina  · Change sanitary pads or tampons often  This will help prevent germs from getting into your urinary tract  · Do pelvic muscle exercises often  Pelvic muscle exercises may help you start and stop urinating   Strong pelvic muscles may help you empty your bladder easier  Squeeze these muscles tightly for 5 seconds like you are trying to hold back urine  Then relax for 5 seconds  Gradually work up to squeezing for 10 seconds  Do 3 sets of 15 repetitions a day, or as directed  © 2017 [employer] Information is for End User's use only and may not be sold, redistributed or otherwise used for commercial purposes  All illustrations and images included in CareNotes® are the copyrighted property of A D A M [employer]  or Baljinder Nunn  The above information is an  only  It is not intended as medical advice for individual conditions or treatments  Talk to your doctor, nurse or pharmacist before following any medical regimen to see if it is safe and effective for you  Chief Complaint     Chief Complaint   Patient presents with   • Possible UTI     Patient c/o vaginal discomfort/pain and urinary frequency that started this morning  History of Present Illness   Tahir Arambula presents to the clinic c/o    This is a 59year old female here today for vaginal discomfort  She states symptoms started while away in [place] and worsened today  She is having vaginal discomfort, she has noticed some discharge while away  She denies any abd pain, nausea or vomiting  No back pain  She does note while in [place] she spent a lot time in bathing suits, hot tubs and playing tennis  She has had yeast infection in the past and symptoms are similar  Review of Systems   Review of Systems   Constitutional: Negative for activity change, chills, fatigue and fever  Respiratory: Negative  Cardiovascular: Negative  Genitourinary: Positive for dysuria, frequency and urgency  Skin: Negative  Neurological: Negative  Psychiatric/Behavioral: Negative            Current Medications     Long-Term Medications   Medication Sig Dispense Refill   • meloxicam (MOBIC) 7 5 mg tablet Take 2 tablets (15 mg total) by mouth daily (Patient not taking: Reported on 12/15/2020) 30 tablet 1       Current Allergies     Allergies as of 03/07/2023   • (No Known Allergies)            The following portions of the patient's history were reviewed and updated as appropriate: allergies, current medications, past family history, past medical history, past social history, past surgical history and problem list     Objective   /80   Pulse 72   Temp 98 1 °F (36 7 °C) (Temporal)   Resp 18   Ht 5' 7" (1 702 m)   Wt 73 kg (161 lb)   LMP  (LMP Unknown)   SpO2 95%   BMI 25 22 kg/m²        Physical Exam     Physical Exam  Constitutional:       General: She is not in acute distress  Appearance: Normal appearance  She is not ill-appearing or toxic-appearing  Cardiovascular:      Rate and Rhythm: Normal rate and regular rhythm  Pulmonary:      Effort: Pulmonary effort is normal    Abdominal:      General: Abdomen is flat  There is no distension  Tenderness: There is no abdominal tenderness  Neurological:      Mental Status: She is alert and oriented to person, place, and time  Psychiatric:         Mood and Affect: Mood normal          Behavior: Behavior normal          Thought Content:  Thought content normal          Judgment: Judgment normal

## 2023-03-09 LAB
BACTERIA UR CULT: ABNORMAL
BACTERIA UR CULT: ABNORMAL

## 2023-04-06 ENCOUNTER — TELEPHONE (OUTPATIENT)
Dept: OBGYN CLINIC | Facility: MEDICAL CENTER | Age: 58
End: 2023-04-06

## 2023-04-06 NOTE — TELEPHONE ENCOUNTER
PT called for refills to medications not listed in medication chart  Pt doesn't  know names of medication but said that [facility] knows  PT scheduled for yearly and pharmacy updated

## 2023-05-05 ENCOUNTER — TELEPHONE (OUTPATIENT)
Dept: OBGYN CLINIC | Facility: MEDICAL CENTER | Age: 58
End: 2023-05-05

## 2023-05-05 NOTE — TELEPHONE ENCOUNTER
Patient called about a refill for medication Progestogen 150 mg capsules, Biest 3mg /ml (estradiol 80% 20 hrtc topical cream of a one year supply  Patient has yearly on 05/25/23 but is currently out of medication  Pharmacy on file [facility]  Please review when you get a chance   Thank you

## 2023-05-25 ENCOUNTER — ANNUAL EXAM (OUTPATIENT)
Dept: OBGYN CLINIC | Facility: CLINIC | Age: 58
End: 2023-05-25

## 2023-05-25 VITALS
WEIGHT: 172 LBS | HEIGHT: 67 IN | SYSTOLIC BLOOD PRESSURE: 104 MMHG | DIASTOLIC BLOOD PRESSURE: 70 MMHG | BODY MASS INDEX: 27 KG/M2

## 2023-05-25 DIAGNOSIS — Z12.31 ENCOUNTER FOR SCREENING MAMMOGRAM FOR MALIGNANT NEOPLASM OF BREAST: ICD-10-CM

## 2023-05-25 DIAGNOSIS — Z01.419 ENCOUNTER FOR WELL WOMAN EXAM WITH ROUTINE GYNECOLOGICAL EXAM: Primary | ICD-10-CM

## 2023-05-25 DIAGNOSIS — N89.8 VAGINAL DISCHARGE: ICD-10-CM

## 2023-05-25 RX ORDER — FLUCONAZOLE 150 MG/1
150 TABLET ORAL ONCE
Qty: 1 TABLET | Refills: 0 | Status: SHIPPED | OUTPATIENT
Start: 2023-05-25 | End: 2023-05-25

## 2023-05-25 NOTE — PROGRESS NOTES
ASSESSMENT & PLAN: Edgardo Miguel is a 59 y o  No obstetric history on file  with normal gynecologic exam     1   Routine well woman exam done today  2  Pap and HPV:  The patient's last pap and hpv was   It was normal     Pap and cotesting was done today  Due today  Current ASCCP Guidelines reviewed  3   Mammogram ordered  4  Colonoscopy -   5  The following were reviewed in today's visit: breast self exam and mammography screening ordered  6  Menopausal symptoms wants to restart HRT, will provide medications and doses    CC:  Annual Gynecologic Examination    HPI: Edgardo Miguel is a 59 y o  No obstetric history on file  who presents for annual gynecologic examination  She has the following concerns:   Reports vaginal irritation, feels like a yeast infection, affirm done today     Health Maintenance:    She wears her seatbelt routinely  She does perform regular monthly self breast exams  She feels safe at home  Pap: 2016  Last mammogram: 2019  Last colonoscopy:     History reviewed  No pertinent past medical history  Past Surgical History:   Procedure Laterality Date   • BLADDER SUSPENSION     • LAPAROSCOPIC SUPRACERVICAL HYSTERECTOMY      age 35       Past OB/Gyn History:  OB History        5    Para   4    Term   4            AB   1    Living   4       SAB        IAB        Ectopic        Multiple        Live Births                   Pt does not have menstrual issues  History of sexually transmitted infection: No   History of abnormal pap smears: No      Patient is currently sexually active  heterosexual   The current method of family planning is none  History reviewed  No pertinent family history      Social History:  Social History     Socioeconomic History   • Marital status: /Civil Union     Spouse name: Not on file   • Number of children: Not on file   • Years of education: Not on file   • Highest education level: Not on file   Occupational History   • Not "on file   Tobacco Use   • Smoking status: Never   • Smokeless tobacco: Never   Vaping Use   • Vaping Use: Never used   Substance and Sexual Activity   • Alcohol use: No   • Drug use: No   • Sexual activity: Yes     Partners: Male     Birth control/protection: None, Female Sterilization   Other Topics Concern   • Not on file   Social History Narrative   • Not on file     Social Determinants of Health     Financial Resource Strain: Not on file   Food Insecurity: Not on file   Transportation Needs: Not on file   Physical Activity: Not on file   Stress: Not on file   Social Connections: Not on file   Intimate Partner Violence: Not on file   Housing Stability: Not on file     No Known Allergies  No current outpatient medications on file  Review of Systems  Constitutional :no fever, feels well, no tiredness, no recent weight gain or loss  ENT: no ear ache, no loss of hearing, no nosebleeds or nasal discharge, no sore throat or hoarseness  Cardiovascular: no complaints of slow or fast heart beat, no chest pain, no palpitations, no leg claudication or lower extremity edema  Respiratory: no complaints of shortness of shortness of breath, no FINE  Breasts:no complaints of breast pain, breast lump, or nipple discharge  Gastrointestinal: no complaints of abdominal pain, constipation, nausea, vomiting, or diarrhea or bloody stools  Genitourinary : no complaints of dysuria, incontinence, pelvic pain, no dysmenorrhea, vaginal discharge or abnormal vaginal bleeding and as noted in HPI  Musculoskeletal: no complaints of arthralgia, no myalgia, no joint swelling or stiffness, no limb pain or swelling    Integumentary: no complaints of skin rash or lesion, itching or dry skin  Neurological: no complaints of headache, no confusion, no numbness or tingling, no dizziness or fainting    Objective      /70 (BP Location: Right arm)   Ht 5' 7\" (1 702 m)   Wt 78 kg (172 lb)   LMP  (LMP Unknown)   BMI 26 94 kg/m²     General:   " appears stated age, cooperative, alert normal mood and affect   Neck: normal, supple,trachea midline, no masses   Heart: regular rate and rhythm, S1, S2 normal, no murmur, click, rub or gallop   Lungs: clear to auscultation bilaterally   Breasts: normal appearance, no masses or tenderness, Inspection negative, No nipple retraction or dimpling, No nipple discharge or bleeding, No axillary or supraclavicular adenopathy, Normal to palpation without dominant masses   Abdomen: soft, non-tender, without masses or organomegaly   Vulva: normal female genitalia, Bartholin's, Urethra, [place] normal, no lesions, normal female hair distribution   Vagina: normal vagina, no discharge, exudate, lesion, or erythema   Urethra: normal   Cervix: Normal, no discharge  Uterus: absent   Adnexa: normal adnexa   Lymphatic palpation of lymph nodes in neck, axilla, groin and/or other locations: no lymphadenopathy or masses noted   Skin normal skin turgor and no rashes     Psychiatric orientation to person, place, and time: normal  mood and affect: normal

## 2023-05-26 LAB
CANDIDA RRNA VAG QL PROBE: NEGATIVE
G VAGINALIS RRNA GENITAL QL PROBE: NEGATIVE
T VAGINALIS RRNA GENITAL QL PROBE: NEGATIVE

## 2023-05-27 LAB
HPV HR 12 DNA CVX QL NAA+PROBE: NEGATIVE
HPV16 DNA CVX QL NAA+PROBE: NEGATIVE
HPV18 DNA CVX QL NAA+PROBE: NEGATIVE

## 2023-06-02 LAB
LAB AP GYN PRIMARY INTERPRETATION: NORMAL
Lab: NORMAL

## 2023-06-23 ENCOUNTER — TELEPHONE (OUTPATIENT)
Dept: FAMILY MEDICINE CLINIC | Facility: CLINIC | Age: 58
End: 2023-06-23

## 2023-07-17 ENCOUNTER — TELEPHONE (OUTPATIENT)
Dept: FAMILY MEDICINE CLINIC | Facility: CLINIC | Age: 58
End: 2023-07-17

## 2023-09-07 ENCOUNTER — TELEPHONE (OUTPATIENT)
Dept: FAMILY MEDICINE CLINIC | Facility: CLINIC | Age: 58
End: 2023-09-07

## 2023-09-21 ENCOUNTER — TELEPHONE (OUTPATIENT)
Dept: OBGYN CLINIC | Facility: MEDICAL CENTER | Age: 58
End: 2023-09-21

## 2023-09-21 NOTE — TELEPHONE ENCOUNTER
Patient called into office and would like to discuss a bill that she received from May that was for lab testing after her visit with us. Please review, thank you!

## 2024-01-07 ENCOUNTER — NURSE TRIAGE (OUTPATIENT)
Dept: FAMILY MEDICINE CLINIC | Facility: CLINIC | Age: 59
End: 2024-01-07

## 2024-01-07 DIAGNOSIS — N30.00 ACUTE CYSTITIS WITHOUT HEMATURIA: Primary | ICD-10-CM

## 2024-01-07 RX ORDER — NITROFURANTOIN 25; 75 MG/1; MG/1
100 CAPSULE ORAL 2 TIMES DAILY
Qty: 10 CAPSULE | Refills: 0 | Status: SHIPPED | OUTPATIENT
Start: 2024-01-07 | End: 2024-01-12

## 2024-01-07 NOTE — TELEPHONE ENCOUNTER
"Regarding: UTI Symptoms/ Pain  ----- Message from Sonja Chan sent at 1/7/2024  9:49 AM EST -----  \" I am having a tremendous amount of pain I think I have a UTI. I was wondering if I can get a prescription sent to the UTI\"    " Problem: Falls - Risk of  Goal: *Absence of Falls  Description: Document Dudley Rush Fall Risk and appropriate interventions in the flowsheet.   Outcome: Progressing Towards Goal  Note: Fall Risk Interventions:  Mobility Interventions: Patient to call before getting OOB         Medication Interventions: Teach patient to arise slowly    Elimination Interventions: Call light in reach    History of Falls Interventions: Door open when patient unattended         Problem: Airway Clearance - Ineffective  Goal: *Patent airway  Outcome: Progressing Towards Goal

## 2024-01-07 NOTE — TELEPHONE ENCOUNTER
"Answer Assessment - Initial Assessment Questions  1. SEVERITY: \"How bad is the pain?\"  (e.g., Scale 1-10; mild, moderate, or severe)    - MILD (1-3): complains slightly about urination hurting    - MODERATE (4-7): interferes with normal activities      - SEVERE (8-10): excruciating, unwilling or unable to urinate because of the pain       8/10      Denies taking any medications     2. FREQUENCY: \"How many times have you had painful urination today?\"       6 times today     3. PATTERN: \"Is pain present every time you urinate or just sometimes?\"       Present with every urination     4. ONSET: \"When did the painful urination start?\"       Last night     5. FEVER: \"Do you have a fever?\" If Yes, ask: \"What is your temperature, how was it measured, and when did it start?\"      Denies     6. PAST UTI: \"Have you had a urine infection before?\" If Yes, ask: \"When was the last time?\" and \"What happened that time?\"       Has a history of UTIs- had one last year     7. CAUSE: \"What do you think is causing the painful urination?\"  (e.g., UTI, scratch, Herpes sore)      UTI    8. OTHER SYMPTOMS: \"Do you have any other symptoms?\" (e.g., flank pain, vaginal discharge, genital sores, urgency, blood in urine)      Denies any flank pain, vaginal discharge, blood in urine       Having frequency and urgency    Protocols used: Urination Pain - Female-ADULT-AH    "

## 2024-01-07 NOTE — TELEPHONE ENCOUNTER
Patient calling in reporting that she started with UTI symptoms last night. Stated she is having pain with urination 8/10, urgency and frequency. Denies any fevers, blood in her urine, vaginal discharge or flank pain. Patient requesting an antibiotic be sent into the pharmacy. On call provider contacted, gave a verbal order to send in Macrobid 100mg  twice a day for 5 days. Prescription sent to the patient's designated pharmacy. Patient made aware. Instructed patient to call back with any further questions, concerns or worsening symptoms. Patient verbalized understanding.   Reason for Disposition  • [1] Painful urination AND [2] EITHER frequency or urgency AND [3] has on-call doctor    Protocols used: Urination Pain - Female-ADULT-

## 2024-01-19 ENCOUNTER — OFFICE VISIT (OUTPATIENT)
Dept: FAMILY MEDICINE CLINIC | Facility: CLINIC | Age: 59
End: 2024-01-19
Payer: COMMERCIAL

## 2024-01-19 VITALS
DIASTOLIC BLOOD PRESSURE: 60 MMHG | TEMPERATURE: 102.4 F | HEIGHT: 67 IN | HEART RATE: 112 BPM | RESPIRATION RATE: 18 BRPM | WEIGHT: 170 LBS | BODY MASS INDEX: 26.68 KG/M2 | OXYGEN SATURATION: 95 % | SYSTOLIC BLOOD PRESSURE: 110 MMHG

## 2024-01-19 DIAGNOSIS — R09.81 NASAL CONGESTION: ICD-10-CM

## 2024-01-19 DIAGNOSIS — R50.9 FEVER, UNSPECIFIED FEVER CAUSE: ICD-10-CM

## 2024-01-19 DIAGNOSIS — R39.9 UTI SYMPTOMS: ICD-10-CM

## 2024-01-19 DIAGNOSIS — R39.9 UTI SYMPTOMS: Primary | ICD-10-CM

## 2024-01-19 DIAGNOSIS — R52 BODY ACHES: ICD-10-CM

## 2024-01-19 LAB
SARS-COV-2 AG UPPER RESP QL IA: NEGATIVE
SL AMB  POCT GLUCOSE, UA: NEGATIVE
SL AMB LEUKOCYTE ESTERASE,UA: ABNORMAL
SL AMB POCT BILIRUBIN,UA: NEGATIVE
SL AMB POCT BLOOD,UA: ABNORMAL
SL AMB POCT CLARITY,UA: ABNORMAL
SL AMB POCT COLOR,UA: YELLOW
SL AMB POCT KETONES,UA: NEGATIVE
SL AMB POCT NITRITE,UA: NEGATIVE
SL AMB POCT PH,UA: 6.5
SL AMB POCT RAPID FLU A: NEGATIVE
SL AMB POCT RAPID FLU B: NEGATIVE
SL AMB POCT SPECIFIC GRAVITY,UA: 1.02
SL AMB POCT URINE PROTEIN: ABNORMAL
SL AMB POCT UROBILINOGEN: ABNORMAL
VALID CONTROL: NORMAL

## 2024-01-19 PROCEDURE — 87077 CULTURE AEROBIC IDENTIFY: CPT | Performed by: FAMILY MEDICINE

## 2024-01-19 PROCEDURE — 87804 INFLUENZA ASSAY W/OPTIC: CPT | Performed by: FAMILY MEDICINE

## 2024-01-19 PROCEDURE — 99214 OFFICE O/P EST MOD 30 MIN: CPT | Performed by: FAMILY MEDICINE

## 2024-01-19 PROCEDURE — 81002 URINALYSIS NONAUTO W/O SCOPE: CPT | Performed by: FAMILY MEDICINE

## 2024-01-19 PROCEDURE — 87811 SARS-COV-2 COVID19 W/OPTIC: CPT | Performed by: FAMILY MEDICINE

## 2024-01-19 PROCEDURE — 87186 SC STD MICRODIL/AGAR DIL: CPT | Performed by: FAMILY MEDICINE

## 2024-01-19 PROCEDURE — 87086 URINE CULTURE/COLONY COUNT: CPT | Performed by: FAMILY MEDICINE

## 2024-01-19 RX ORDER — NITROFURANTOIN 25; 75 MG/1; MG/1
100 CAPSULE ORAL 2 TIMES DAILY
Qty: 10 CAPSULE | Refills: 0 | Status: SHIPPED | OUTPATIENT
Start: 2024-01-19 | End: 2024-01-24

## 2024-01-19 NOTE — PROGRESS NOTES
"Assessment/Plan:       Problem List Items Addressed This Visit    None  Visit Diagnoses       UTI symptoms    -  Primary    Relevant Medications    nitrofurantoin (MACROBID) 100 mg capsule    Other Relevant Orders    POCT urine dip (Completed)    Urine culture    Body aches        Relevant Orders    POCT Rapid Covid Ag (Completed)    POCT rapid flu A and B (Completed)    Fever, unspecified fever cause        Relevant Orders    POCT Rapid Covid Ag (Completed)    POCT rapid flu A and B (Completed)    Nasal congestion        Relevant Orders    POCT Rapid Covid Ag (Completed)    POCT rapid flu A and B (Completed)              COVID/Flu negative, suspect viral illness,  with similar symptoms. Suspect fever is related to current illness and not UTI, no CVA tenderness. Treat with Macrobid 100 mg BID. Urine culture sent. Any worsening or persistent symptoms, seek emergency care over weekend.     Subjective:      Patient ID: Antoinette Rosales is a 58 y.o. female.    HPI    This whole week has been more tired, run down, then last night started with coughing, sore throat, body aches, chills, fever today.  sick with similar symptoms.     Additionally, around noon today started with UTI symptoms, burning, frequency, urgency. No abdominal pain or new back pain.     The following portions of the patient's history were reviewed and updated as appropriate: allergies, current medications, past family history, past medical history, past social history, past surgical history, and problem list.    Review of Systems   All other systems reviewed and are negative.        Objective:      /60   Pulse (!) 112   Temp (!) 102.4 °F (39.1 °C) (Tympanic)   Resp 18   Ht 5' 7\" (1.702 m)   Wt 77.1 kg (170 lb)   LMP  (LMP Unknown)   SpO2 95%   BMI 26.63 kg/m²          Physical Exam  Vitals reviewed.   Constitutional:       General: She is not in acute distress.     Appearance: Normal appearance. She is not ill-appearing, " toxic-appearing or diaphoretic.   HENT:      Head: Normocephalic and atraumatic.      Right Ear: Tympanic membrane, ear canal and external ear normal. There is no impacted cerumen.      Left Ear: Tympanic membrane, ear canal and external ear normal. There is no impacted cerumen.      Nose: Congestion present.   Eyes:      General:         Right eye: No discharge.         Left eye: No discharge.      Extraocular Movements: Extraocular movements intact.      Conjunctiva/sclera: Conjunctivae normal.   Cardiovascular:      Rate and Rhythm: Regular rhythm. Tachycardia present.      Heart sounds: Normal heart sounds. No murmur heard.     No friction rub. No gallop.   Pulmonary:      Effort: Pulmonary effort is normal. No respiratory distress.      Breath sounds: Normal breath sounds. No stridor. No wheezing or rhonchi.   Abdominal:      Tenderness: There is no right CVA tenderness or left CVA tenderness.   Musculoskeletal:         General: No swelling, tenderness or signs of injury.      Right lower leg: No edema.      Left lower leg: No edema.   Skin:     General: Skin is warm.      Coloration: Skin is not pale.      Findings: No erythema or rash.   Neurological:      Mental Status: She is alert and oriented to person, place, and time.      Motor: No weakness.   Psychiatric:         Mood and Affect: Mood normal.         Behavior: Behavior normal.             Cindy Jung DO  [facility]

## 2024-01-20 ENCOUNTER — TELEPHONE (OUTPATIENT)
Dept: FAMILY MEDICINE CLINIC | Facility: CLINIC | Age: 59
End: 2024-01-20

## 2024-01-20 NOTE — TELEPHONE ENCOUNTER
Called patient to follow-up from yesterday, and she reports she is feeling much better. Advised to call if she needs anything.

## 2024-01-21 LAB — BACTERIA UR CULT: ABNORMAL

## 2024-05-20 ENCOUNTER — TELEPHONE (OUTPATIENT)
Dept: FAMILY MEDICINE CLINIC | Facility: CLINIC | Age: 59
End: 2024-05-20

## 2024-05-20 ENCOUNTER — CLINICAL SUPPORT (OUTPATIENT)
Dept: FAMILY MEDICINE CLINIC | Facility: CLINIC | Age: 59
End: 2024-05-20
Payer: COMMERCIAL

## 2024-05-20 DIAGNOSIS — J02.9 SORE THROAT: Primary | ICD-10-CM

## 2024-05-20 LAB — S PYO AG THROAT QL: NEGATIVE

## 2024-05-20 PROCEDURE — 87880 STREP A ASSAY W/OPTIC: CPT

## 2024-05-20 PROCEDURE — 87070 CULTURE OTHR SPECIMN AEROBIC: CPT | Performed by: NURSE PRACTITIONER

## 2024-05-20 PROCEDURE — 99211 OFF/OP EST MAY X REQ PHY/QHP: CPT

## 2024-05-20 NOTE — TELEPHONE ENCOUNTER
Patient was transferred from pod to office. Would like to be seen today. Is experiencing sore throat, hot flashes, thinks she may have strep. Could she be fit in?

## 2024-05-20 NOTE — TELEPHONE ENCOUNTER
Received transfer from Pod, Patient called back. Unfortunately the appointment slot offered to patient had already been filled. Offered patient a nurse visit to come in and get swabbed for strep, per PCP instructions. Patient informed and expressed understanding, will come in shortly for nurse visit.

## 2024-05-21 ENCOUNTER — TELEPHONE (OUTPATIENT)
Age: 59
End: 2024-05-21

## 2024-05-21 DIAGNOSIS — J02.0 STREP PHARYNGITIS: Primary | ICD-10-CM

## 2024-05-21 RX ORDER — AMOXICILLIN AND CLAVULANATE POTASSIUM 875; 125 MG/1; MG/1
1 TABLET, FILM COATED ORAL EVERY 12 HOURS SCHEDULED
Qty: 20 TABLET | Refills: 0 | Status: SHIPPED | OUTPATIENT
Start: 2024-05-21 | End: 2024-05-31

## 2024-05-21 NOTE — TELEPHONE ENCOUNTER
Patient came in for a strep swab yesterday. Rapid Strep was negative, throat culture sent out. Still waiting on culture results.

## 2024-05-21 NOTE — TELEPHONE ENCOUNTER
Patient called regarding the request for an antibiotic she made at her appointment on 5/20/24.  She stated the nurse told her he would send a message to [facility] to request one but there is no record of this in patient's chart.  Patient would like something sent to the [facility] in [place], if possible.  Please call patient back to let her know if [facility] would like to prescribe anything to her.

## 2024-05-22 LAB — BACTERIA THROAT CULT: NORMAL

## 2024-08-15 ENCOUNTER — TELEPHONE (OUTPATIENT)
Age: 59
End: 2024-08-15

## 2024-08-15 NOTE — TELEPHONE ENCOUNTER
Patient had called to schedule yearly visit with Dr. Betancourt in November. Patient had also wanted to request a refill of her hormone replacement medication that Dr. Betancourt prescribes but I did not see those medication on file. Patient was going to call pharmacy to get that information and have the pharmacy reach out for a refill.

## 2024-08-27 ENCOUNTER — TELEPHONE (OUTPATIENT)
Age: 59
End: 2024-08-27

## 2024-11-21 ENCOUNTER — ANNUAL EXAM (OUTPATIENT)
Dept: OBGYN CLINIC | Facility: CLINIC | Age: 59
End: 2024-11-21
Payer: COMMERCIAL

## 2024-11-21 VITALS
WEIGHT: 163.8 LBS | SYSTOLIC BLOOD PRESSURE: 100 MMHG | BODY MASS INDEX: 25.71 KG/M2 | DIASTOLIC BLOOD PRESSURE: 60 MMHG | HEIGHT: 67 IN

## 2024-11-21 DIAGNOSIS — Z01.419 ENCOUNTER FOR WELL WOMAN EXAM WITH ROUTINE GYNECOLOGICAL EXAM: Primary | ICD-10-CM

## 2024-11-21 PROCEDURE — S0612 ANNUAL GYNECOLOGICAL EXAMINA: HCPCS | Performed by: OBSTETRICS & GYNECOLOGY

## 2024-11-21 NOTE — PROGRESS NOTES
ASSESSMENT & PLAN: Antoinette Rosales is a 58 y.o. No obstetric history on file. with normal gynecologic exam.    1.  Routine well woman exam done today  2.  Pap and HPV:  The patient's last pap and hpv was .  It was normal.    Pap and cotesting was done today.  Due today  Current ASCCP Guidelines reviewed.   3.  Mammogram ordered  4.  Colonoscopy -   5. The following were reviewed in today's visit: breast self exam and mammography screening ordered  6. Menopausal symptoms wants to restart HRT, will provide medications and doses    CC:  Annual Gynecologic Examination    HPI: Antoinette Rosales is a 58 y.o. No obstetric history on file. who presents for annual gynecologic examination.  She has the following concerns:   Reports vaginal irritation, feels like a yeast infection, affirm done today     Health Maintenance:    She wears her seatbelt routinely.    She does perform regular monthly self breast exams.    She feels safe at home.     Pap:   Last mammogram:   Last colonoscopy:     History reviewed. No pertinent past medical history.    Past Surgical History:   Procedure Laterality Date    BLADDER SUSPENSION      LAPAROSCOPIC SUPRACERVICAL HYSTERECTOMY      age 41       Past OB/Gyn History:  OB History          5    Para   4    Term   4            AB   1    Living   4         SAB        IAB        Ectopic        Multiple        Live Births                   Pt does not have menstrual issues.    History of sexually transmitted infection: No.  History of abnormal pap smears: No .    Patient is currently sexually active.  heterosexual   The current method of family planning is none.    History reviewed. No pertinent family history.    Social History:  Social History     Socioeconomic History    Marital status: /Civil Union     Spouse name: Not on file    Number of children: Not on file    Years of education: Not on file    Highest education level: Not on file   Occupational History    Not on  "file   Tobacco Use    Smoking status: Never    Smokeless tobacco: Never   Vaping Use    Vaping status: Never Used   Substance and Sexual Activity    Alcohol use: No    Drug use: No    Sexual activity: Yes     Partners: Male     Birth control/protection: None, Female Sterilization   Other Topics Concern    Not on file   Social History Narrative    Not on file     Social Drivers of Health     Financial Resource Strain: Not on file   Food Insecurity: Not on file   Transportation Needs: Not on file   Physical Activity: Not on file   Stress: Not on file   Social Connections: Not on file   Intimate Partner Violence: Not on file   Housing Stability: Not on file     No Known Allergies  No current outpatient medications on file.      Review of Systems  Constitutional :no fever, feels well, no tiredness, no recent weight gain or loss  ENT: no ear ache, no loss of hearing, no nosebleeds or nasal discharge, no sore throat or hoarseness.  Cardiovascular: no complaints of slow or fast heart beat, no chest pain, no palpitations, no leg claudication or lower extremity edema.  Respiratory: no complaints of shortness of shortness of breath, no FINE  Breasts:no complaints of breast pain, breast lump, or nipple discharge  Gastrointestinal: no complaints of abdominal pain, constipation, nausea, vomiting, or diarrhea or bloody stools  Genitourinary : no complaints of dysuria, incontinence, pelvic pain, no dysmenorrhea, vaginal discharge or abnormal vaginal bleeding and as noted in HPI.  Musculoskeletal: no complaints of arthralgia, no myalgia, no joint swelling or stiffness, no limb pain or swelling.  Integumentary: no complaints of skin rash or lesion, itching or dry skin  Neurological: no complaints of headache, no confusion, no numbness or tingling, no dizziness or fainting    Objective      /60   Ht 5' 7\" (1.702 m)   Wt 74.3 kg (163 lb 12.8 oz)   LMP  (LMP Unknown)   BMI 25.65 kg/m²     General:   appears stated age, " cooperative, alert normal mood and affect   Neck: normal, supple,trachea midline, no masses   Heart: regular rate and rhythm, S1, S2 normal, no murmur, click, rub or gallop   Lungs: clear to auscultation bilaterally   Breasts: normal appearance, no masses or tenderness, Inspection negative, No nipple retraction or dimpling, No nipple discharge or bleeding, No axillary or supraclavicular adenopathy, Normal to palpation without dominant masses   Abdomen: soft, non-tender, without masses or organomegaly   Vulva: normal female genitalia, Bartholin's, Urethra, [place] normal, no lesions, normal female hair distribution   Vagina: normal vagina, no discharge, exudate, lesion, or erythema   Urethra: normal   Cervix: Normal, no discharge.   Uterus: absent   Adnexa: normal adnexa   Lymphatic palpation of lymph nodes in neck, axilla, groin and/or other locations: no lymphadenopathy or masses noted   Skin normal skin turgor and no rashes.   Psychiatric orientation to person, place, and time: normal. mood and affect: normal

## 2024-11-22 ENCOUNTER — TELEPHONE (OUTPATIENT)
Dept: OBGYN CLINIC | Facility: CLINIC | Age: 59
End: 2024-11-22

## 2024-11-22 NOTE — TELEPHONE ENCOUNTER
Called [facility] to verify pt rx and dosage, scripts were sent over and provider filled out and resent.

## 2025-04-17 ENCOUNTER — OFFICE VISIT (OUTPATIENT)
Dept: FAMILY MEDICINE CLINIC | Facility: CLINIC | Age: 60
End: 2025-04-17
Payer: COMMERCIAL

## 2025-04-17 VITALS
HEIGHT: 67 IN | OXYGEN SATURATION: 98 % | BODY MASS INDEX: 26.37 KG/M2 | DIASTOLIC BLOOD PRESSURE: 48 MMHG | SYSTOLIC BLOOD PRESSURE: 98 MMHG | TEMPERATURE: 97.5 F | HEART RATE: 70 BPM | RESPIRATION RATE: 18 BRPM | WEIGHT: 168 LBS

## 2025-04-17 DIAGNOSIS — Z00.00 ANNUAL PHYSICAL EXAM: Primary | ICD-10-CM

## 2025-04-17 DIAGNOSIS — Z12.31 ENCOUNTER FOR SCREENING MAMMOGRAM FOR BREAST CANCER: ICD-10-CM

## 2025-04-17 DIAGNOSIS — Z23 ENCOUNTER FOR IMMUNIZATION: ICD-10-CM

## 2025-04-17 DIAGNOSIS — Z13.220 SCREENING CHOLESTEROL LEVEL: ICD-10-CM

## 2025-04-17 PROCEDURE — 90750 HZV VACC RECOMBINANT IM: CPT

## 2025-04-17 PROCEDURE — 99396 PREV VISIT EST AGE 40-64: CPT | Performed by: NURSE PRACTITIONER

## 2025-04-17 PROCEDURE — 90471 IMMUNIZATION ADMIN: CPT

## 2025-04-17 NOTE — PROGRESS NOTES
Adult Annual Physical  Name: Antoinette Rosales      : 1965      MRN: [id number]  Encounter Provider: LEE Means  Encounter Date: 2025   Encounter department: [facility]    :  Assessment & Plan  Encounter for screening mammogram for breast cancer    Orders:    Mammo screening bilateral w 3d and cad; Future    Annual physical exam    Orders:    Comprehensive metabolic panel; Future    CBC and differential; Future    Screening cholesterol level    Orders:    Lipid panel; Future    Encounter for immunization    Orders:    Zoster Vaccine Recombinant IM        Preventive Screenings:  - Diabetes Screening: screening not indicated  - Cholesterol Screening: orders placed   - Hepatitis C screening: screening not indicated   - HIV screening: screening not indicated   - Cervical cancer screening: screening up-to-date   - Breast cancer screening: orders placed   - Colon cancer screening: screening up-to-date   - Lung cancer screening: screening not indicated     Immunizations:  - Immunizations due: Influenza      Depression Screening and Follow-up Plan: Patient was screened for depression during today's encounter. They screened negative with a PHQ-2 score of 0.          History of Present Illness     Adult Annual Physical:  Patient presents for annual physical. Here for annual physical- .     Depression Screening:  - PHQ-2 Score: 0    Review of Systems   Constitutional:  Negative for activity change, diaphoresis, fatigue and fever.   HENT:  Negative for congestion, facial swelling, hearing loss, rhinorrhea, sinus pressure, sinus pain, sneezing, sore throat and voice change.    Eyes:  Negative for discharge and visual disturbance.   Respiratory:  Negative for cough, choking, chest tightness, shortness of breath, wheezing and stridor.    Cardiovascular:  Negative for chest pain, palpitations and leg swelling.   Gastrointestinal:  Negative for abdominal distention, abdominal pain,  "constipation, diarrhea, nausea and vomiting.   Endocrine: Negative for polydipsia, polyphagia and polyuria.   Genitourinary:  Negative for difficulty urinating, dysuria, frequency and urgency.   Musculoskeletal:  Negative for arthralgias, back pain, gait problem, joint swelling, myalgias, neck pain and neck stiffness.   Skin:  Negative for color change, rash and wound.   Neurological:  Negative for dizziness, syncope, speech difficulty, weakness, light-headedness and headaches.   Hematological:  Negative for adenopathy. Does not bruise/bleed easily.   Psychiatric/Behavioral:  Negative for agitation, behavioral problems, confusion, hallucinations, sleep disturbance and suicidal ideas. The patient is not nervous/anxious.      Medical History Reviewed by provider this encounter:  Tobacco  Allergies  Meds  Problems  Med Hx  Surg Hx  Fam Hx     .    Objective   BP (!) 98/48   Pulse 70   Temp 97.5 °F (36.4 °C)   Resp 18   Ht 5' 7\" (1.702 m)   Wt 76.2 kg (168 lb)   LMP  (LMP Unknown)   SpO2 98%   BMI 26.31 kg/m²     Physical Exam  Vitals and nursing note reviewed.   Constitutional:       General: She is not in acute distress.     Appearance: She is well-developed. She is not diaphoretic.   HENT:      Head: Normocephalic and atraumatic.      Right Ear: Tympanic membrane, ear canal and external ear normal.      Left Ear: Tympanic membrane, ear canal and external ear normal.      Nose: Nose normal.      Mouth/Throat:      Mouth: Mucous membranes are moist.      Pharynx: Oropharynx is clear. Uvula midline.   Eyes:      General:         Right eye: No discharge.         Left eye: No discharge.      Conjunctiva/sclera: Conjunctivae normal.   Neck:      Thyroid: No thyromegaly.   Cardiovascular:      Rate and Rhythm: Normal rate and regular rhythm.      Heart sounds: Normal heart sounds. No murmur heard.     No friction rub. No gallop.   Pulmonary:      Effort: Pulmonary effort is normal. No respiratory distress.    "   Breath sounds: Normal breath sounds. No wheezing or rales.   Musculoskeletal:         General: No tenderness or deformity. Normal range of motion.      Cervical back: Normal range of motion and neck supple.   Skin:     General: Skin is warm and dry.      Findings: No erythema or rash.   Neurological:      Mental Status: She is alert and oriented to person, place, and time.      Cranial Nerves: No cranial nerve deficit.      Coordination: Coordination normal.   Psychiatric:         Mood and Affect: Mood normal.         Behavior: Behavior normal.         Thought Content: Thought content normal.         Judgment: Judgment normal.

## 2025-06-04 ENCOUNTER — TELEPHONE (OUTPATIENT)
Dept: FAMILY MEDICINE CLINIC | Facility: CLINIC | Age: 60
End: 2025-06-04

## 2025-06-04 NOTE — TELEPHONE ENCOUNTER
I called patient.She is over due for mammogram.I called and lmom for her to please call. Will follow up